# Patient Record
Sex: MALE | Race: WHITE | NOT HISPANIC OR LATINO | URBAN - METROPOLITAN AREA
[De-identification: names, ages, dates, MRNs, and addresses within clinical notes are randomized per-mention and may not be internally consistent; named-entity substitution may affect disease eponyms.]

---

## 2017-01-23 ENCOUNTER — RECORDS - HEALTHEAST (OUTPATIENT)
Dept: LAB | Facility: CLINIC | Age: 62
End: 2017-01-23

## 2017-01-23 LAB
CHOLEST SERPL-MCNC: 189 MG/DL
FASTING STATUS PATIENT QL REPORTED: ABNORMAL
HDLC SERPL-MCNC: 41 MG/DL
LDLC SERPL CALC-MCNC: 112 MG/DL
TRIGL SERPL-MCNC: 182 MG/DL

## 2017-01-24 LAB — HBA1C MFR BLD: 5.4 % (ref 4.2–6.1)

## 2017-05-16 ENCOUNTER — AMBULATORY - HEALTHEAST (OUTPATIENT)
Dept: FAMILY MEDICINE | Facility: CLINIC | Age: 62
End: 2017-05-16

## 2017-05-16 ENCOUNTER — OFFICE VISIT - HEALTHEAST (OUTPATIENT)
Dept: FAMILY MEDICINE | Facility: CLINIC | Age: 62
End: 2017-05-16

## 2017-05-16 DIAGNOSIS — F20.9 SCHIZOPHRENIA, CHRONIC CONDITION (H): ICD-10-CM

## 2017-05-16 DIAGNOSIS — M48.061 LUMBAR SPINAL STENOSIS: ICD-10-CM

## 2017-05-16 DIAGNOSIS — E03.9 HYPOTHYROIDISM: ICD-10-CM

## 2017-05-16 RX ORDER — METHYLPREDNISOLONE 4 MG
1500 TABLET, DOSE PACK ORAL
Status: SHIPPED | COMMUNITY
Start: 2017-05-16

## 2017-09-29 ENCOUNTER — RECORDS - HEALTHEAST (OUTPATIENT)
Dept: ADMINISTRATIVE | Facility: OTHER | Age: 62
End: 2017-09-29

## 2017-09-29 ENCOUNTER — OFFICE VISIT - HEALTHEAST (OUTPATIENT)
Dept: FAMILY MEDICINE | Facility: CLINIC | Age: 62
End: 2017-09-29

## 2017-09-29 DIAGNOSIS — N48.89 PENILE EDEMA: ICD-10-CM

## 2017-09-29 DIAGNOSIS — L08.9 SKIN LESION, INFECTED: ICD-10-CM

## 2017-10-03 ENCOUNTER — RECORDS - HEALTHEAST (OUTPATIENT)
Dept: ADMINISTRATIVE | Facility: OTHER | Age: 62
End: 2017-10-03

## 2017-10-27 ENCOUNTER — RECORDS - HEALTHEAST (OUTPATIENT)
Dept: ADMINISTRATIVE | Facility: OTHER | Age: 62
End: 2017-10-27

## 2017-11-17 ENCOUNTER — RECORDS - HEALTHEAST (OUTPATIENT)
Dept: ADMINISTRATIVE | Facility: OTHER | Age: 62
End: 2017-11-17

## 2017-12-01 ENCOUNTER — RECORDS - HEALTHEAST (OUTPATIENT)
Dept: ADMINISTRATIVE | Facility: OTHER | Age: 62
End: 2017-12-01

## 2018-01-26 ENCOUNTER — RECORDS - HEALTHEAST (OUTPATIENT)
Dept: ADMINISTRATIVE | Facility: OTHER | Age: 63
End: 2018-01-26

## 2018-02-07 ENCOUNTER — AMBULATORY - HEALTHEAST (OUTPATIENT)
Dept: VASCULAR SURGERY | Facility: CLINIC | Age: 63
End: 2018-02-07

## 2018-02-07 DIAGNOSIS — N48.89 SWELLING OF PENIS: ICD-10-CM

## 2018-02-20 ENCOUNTER — AMBULATORY - HEALTHEAST (OUTPATIENT)
Dept: VASCULAR SURGERY | Facility: CLINIC | Age: 63
End: 2018-02-20

## 2018-02-21 ENCOUNTER — OFFICE VISIT - HEALTHEAST (OUTPATIENT)
Dept: VASCULAR SURGERY | Facility: CLINIC | Age: 63
End: 2018-02-21

## 2018-02-21 ENCOUNTER — RECORDS - HEALTHEAST (OUTPATIENT)
Dept: ADMINISTRATIVE | Facility: OTHER | Age: 63
End: 2018-02-21

## 2018-02-21 DIAGNOSIS — N48.5: ICD-10-CM

## 2018-02-21 DIAGNOSIS — N48.89 PAINFUL PENIS: ICD-10-CM

## 2018-02-21 ASSESSMENT — MIFFLIN-ST. JEOR: SCORE: 1880.35

## 2018-02-26 ENCOUNTER — AMBULATORY - HEALTHEAST (OUTPATIENT)
Dept: VASCULAR SURGERY | Facility: CLINIC | Age: 63
End: 2018-02-26

## 2018-02-26 ENCOUNTER — COMMUNICATION - HEALTHEAST (OUTPATIENT)
Dept: VASCULAR SURGERY | Facility: CLINIC | Age: 63
End: 2018-02-26

## 2018-02-26 DIAGNOSIS — N48.89 PAINFUL PENIS: ICD-10-CM

## 2018-02-26 DIAGNOSIS — N48.5: ICD-10-CM

## 2018-02-26 LAB
BACTERIA SPEC CULT: ABNORMAL
GRAM STAIN RESULT: ABNORMAL
GRAM STAIN RESULT: ABNORMAL

## 2018-03-15 ENCOUNTER — OFFICE VISIT - HEALTHEAST (OUTPATIENT)
Dept: VASCULAR SURGERY | Facility: CLINIC | Age: 63
End: 2018-03-15

## 2018-03-15 DIAGNOSIS — N48.5: ICD-10-CM

## 2018-03-15 ASSESSMENT — MIFFLIN-ST. JEOR: SCORE: 1884.88

## 2018-03-26 ENCOUNTER — OFFICE VISIT - HEALTHEAST (OUTPATIENT)
Dept: VASCULAR SURGERY | Facility: CLINIC | Age: 63
End: 2018-03-26

## 2018-03-26 DIAGNOSIS — N48.5: ICD-10-CM

## 2018-03-26 ASSESSMENT — MIFFLIN-ST. JEOR: SCORE: 1884.88

## 2018-04-16 ENCOUNTER — RECORDS - HEALTHEAST (OUTPATIENT)
Dept: ADMINISTRATIVE | Facility: OTHER | Age: 63
End: 2018-04-16

## 2018-04-16 ENCOUNTER — OFFICE VISIT - HEALTHEAST (OUTPATIENT)
Dept: VASCULAR SURGERY | Facility: CLINIC | Age: 63
End: 2018-04-16

## 2018-04-16 DIAGNOSIS — N48.5: ICD-10-CM

## 2018-05-17 ENCOUNTER — OFFICE VISIT - HEALTHEAST (OUTPATIENT)
Dept: VASCULAR SURGERY | Facility: CLINIC | Age: 63
End: 2018-05-17

## 2018-05-17 DIAGNOSIS — N48.5: ICD-10-CM

## 2018-06-13 ENCOUNTER — OFFICE VISIT - HEALTHEAST (OUTPATIENT)
Dept: VASCULAR SURGERY | Facility: CLINIC | Age: 63
End: 2018-06-13

## 2018-06-13 ENCOUNTER — RECORDS - HEALTHEAST (OUTPATIENT)
Dept: ADMINISTRATIVE | Facility: OTHER | Age: 63
End: 2018-06-13

## 2018-06-13 DIAGNOSIS — N48.5: ICD-10-CM

## 2018-06-13 ASSESSMENT — MIFFLIN-ST. JEOR: SCORE: 1903.03

## 2018-07-11 ENCOUNTER — OFFICE VISIT - HEALTHEAST (OUTPATIENT)
Dept: VASCULAR SURGERY | Facility: CLINIC | Age: 63
End: 2018-07-11

## 2018-07-11 DIAGNOSIS — N48.5: ICD-10-CM

## 2018-08-30 ENCOUNTER — RECORDS - HEALTHEAST (OUTPATIENT)
Dept: ADMINISTRATIVE | Facility: OTHER | Age: 63
End: 2018-08-30

## 2018-08-30 ENCOUNTER — OFFICE VISIT - HEALTHEAST (OUTPATIENT)
Dept: VASCULAR SURGERY | Facility: CLINIC | Age: 63
End: 2018-08-30

## 2018-08-30 DIAGNOSIS — N48.5: ICD-10-CM

## 2018-08-30 ASSESSMENT — MIFFLIN-ST. JEOR: SCORE: 1849.05

## 2018-10-11 ENCOUNTER — OFFICE VISIT - HEALTHEAST (OUTPATIENT)
Dept: VASCULAR SURGERY | Facility: CLINIC | Age: 63
End: 2018-10-11

## 2018-10-11 DIAGNOSIS — N48.5: ICD-10-CM

## 2018-10-11 ASSESSMENT — MIFFLIN-ST. JEOR: SCORE: 1843.61

## 2018-10-22 ENCOUNTER — COMMUNICATION - HEALTHEAST (OUTPATIENT)
Dept: VASCULAR SURGERY | Facility: CLINIC | Age: 63
End: 2018-10-22

## 2018-10-29 ENCOUNTER — RECORDS - HEALTHEAST (OUTPATIENT)
Dept: ADMINISTRATIVE | Facility: OTHER | Age: 63
End: 2018-10-29

## 2018-11-13 ENCOUNTER — OFFICE VISIT - HEALTHEAST (OUTPATIENT)
Dept: FAMILY MEDICINE | Facility: CLINIC | Age: 63
End: 2018-11-13

## 2018-11-13 DIAGNOSIS — M54.50 ACUTE BILATERAL LOW BACK PAIN WITHOUT SCIATICA: ICD-10-CM

## 2018-11-13 DIAGNOSIS — Z12.11 SCREEN FOR COLON CANCER: ICD-10-CM

## 2018-11-14 ENCOUNTER — COMMUNICATION - HEALTHEAST (OUTPATIENT)
Dept: FAMILY MEDICINE | Facility: CLINIC | Age: 63
End: 2018-11-14

## 2018-11-15 ENCOUNTER — HOSPITAL ENCOUNTER (OUTPATIENT)
Dept: PHYSICAL MEDICINE AND REHAB | Facility: CLINIC | Age: 63
Discharge: HOME | End: 2018-11-15
Attending: FAMILY MEDICINE

## 2018-11-15 DIAGNOSIS — Z98.1 HISTORY OF LUMBAR FUSION: ICD-10-CM

## 2018-11-15 DIAGNOSIS — S39.012A STRAIN OF LUMBAR REGION, INITIAL ENCOUNTER: ICD-10-CM

## 2018-11-15 DIAGNOSIS — M54.50 LUMBAR SPINE PAIN: ICD-10-CM

## 2018-11-15 ASSESSMENT — MIFFLIN-ST. JEOR: SCORE: 1810.05

## 2018-11-16 ENCOUNTER — OFFICE VISIT - HEALTHEAST (OUTPATIENT)
Dept: PHYSICAL THERAPY | Facility: REHABILITATION | Age: 63
End: 2018-11-16

## 2018-11-16 ENCOUNTER — HOSPITAL ENCOUNTER (OUTPATIENT)
Dept: RADIOLOGY | Facility: CLINIC | Age: 63
Discharge: HOME | End: 2018-11-16
Attending: PHYSICIAN ASSISTANT

## 2018-11-16 DIAGNOSIS — M54.50 LUMBAR SPINE PAIN: ICD-10-CM

## 2018-11-16 DIAGNOSIS — Z98.1 HISTORY OF LUMBAR FUSION: ICD-10-CM

## 2018-11-16 DIAGNOSIS — S39.012A STRAIN OF LUMBAR REGION, INITIAL ENCOUNTER: ICD-10-CM

## 2018-11-19 ENCOUNTER — COMMUNICATION - HEALTHEAST (OUTPATIENT)
Dept: PHYSICAL MEDICINE AND REHAB | Facility: CLINIC | Age: 63
End: 2018-11-19

## 2018-11-20 ENCOUNTER — OFFICE VISIT - HEALTHEAST (OUTPATIENT)
Dept: PHYSICAL THERAPY | Facility: REHABILITATION | Age: 63
End: 2018-11-20

## 2018-11-20 DIAGNOSIS — Z98.1 HISTORY OF LUMBAR FUSION: ICD-10-CM

## 2018-11-20 DIAGNOSIS — M54.50 LUMBAR SPINE PAIN: ICD-10-CM

## 2018-11-20 DIAGNOSIS — S39.012A STRAIN OF LUMBAR REGION, INITIAL ENCOUNTER: ICD-10-CM

## 2018-11-26 ENCOUNTER — OFFICE VISIT - HEALTHEAST (OUTPATIENT)
Dept: VASCULAR SURGERY | Facility: CLINIC | Age: 63
End: 2018-11-26

## 2018-11-26 DIAGNOSIS — N48.5: ICD-10-CM

## 2018-11-26 ASSESSMENT — MIFFLIN-ST. JEOR: SCORE: 1808.23

## 2018-11-27 ENCOUNTER — OFFICE VISIT - HEALTHEAST (OUTPATIENT)
Dept: PHYSICAL THERAPY | Facility: REHABILITATION | Age: 63
End: 2018-11-27

## 2018-11-27 DIAGNOSIS — M54.50 LUMBAR SPINE PAIN: ICD-10-CM

## 2018-11-27 DIAGNOSIS — Z98.1 HISTORY OF LUMBAR FUSION: ICD-10-CM

## 2018-11-27 DIAGNOSIS — S39.012A STRAIN OF LUMBAR REGION, INITIAL ENCOUNTER: ICD-10-CM

## 2018-12-14 ENCOUNTER — HOSPITAL ENCOUNTER (OUTPATIENT)
Dept: PHYSICAL MEDICINE AND REHAB | Facility: CLINIC | Age: 63
Discharge: HOME | End: 2018-12-14
Attending: PHYSICIAN ASSISTANT

## 2018-12-14 DIAGNOSIS — M54.50 LUMBAR SPINE PAIN: ICD-10-CM

## 2018-12-14 DIAGNOSIS — Z98.1 HISTORY OF LUMBAR FUSION: ICD-10-CM

## 2018-12-14 DIAGNOSIS — M47.816 LUMBAR FACET ARTHROPATHY: ICD-10-CM

## 2019-01-07 ENCOUNTER — RECORDS - HEALTHEAST (OUTPATIENT)
Dept: ADMINISTRATIVE | Facility: OTHER | Age: 64
End: 2019-01-07

## 2019-01-08 ENCOUNTER — HOSPITAL ENCOUNTER (OUTPATIENT)
Dept: MRI IMAGING | Facility: CLINIC | Age: 64
Discharge: HOME | End: 2019-01-08
Attending: PHYSICIAN ASSISTANT

## 2019-01-08 DIAGNOSIS — M54.50 LUMBAR SPINE PAIN: ICD-10-CM

## 2019-01-08 DIAGNOSIS — Z98.1 HISTORY OF LUMBAR FUSION: ICD-10-CM

## 2019-01-09 ENCOUNTER — COMMUNICATION - HEALTHEAST (OUTPATIENT)
Dept: PHYSICAL MEDICINE AND REHAB | Facility: CLINIC | Age: 64
End: 2019-01-09

## 2019-01-09 DIAGNOSIS — M47.816 LUMBAR FACET JOINT SYNDROME: ICD-10-CM

## 2019-01-14 ENCOUNTER — RECORDS - HEALTHEAST (OUTPATIENT)
Dept: ADMINISTRATIVE | Facility: OTHER | Age: 64
End: 2019-01-14

## 2019-01-28 ENCOUNTER — HOSPITAL ENCOUNTER (OUTPATIENT)
Dept: PHYSICAL MEDICINE AND REHAB | Facility: CLINIC | Age: 64
Discharge: HOME | End: 2019-01-28
Attending: PHYSICIAN ASSISTANT

## 2019-01-28 DIAGNOSIS — M47.816 LUMBAR FACET JOINT SYNDROME: ICD-10-CM

## 2019-02-11 ENCOUNTER — HOSPITAL ENCOUNTER (OUTPATIENT)
Dept: PHYSICAL MEDICINE AND REHAB | Facility: CLINIC | Age: 64
Discharge: HOME | End: 2019-02-11
Attending: PHYSICIAN ASSISTANT

## 2019-02-11 DIAGNOSIS — M54.6 ACUTE BILATERAL THORACIC BACK PAIN: ICD-10-CM

## 2019-02-11 DIAGNOSIS — M79.18 MYOFASCIAL PAIN: ICD-10-CM

## 2019-02-11 DIAGNOSIS — Z98.1 HISTORY OF LUMBAR FUSION: ICD-10-CM

## 2019-02-11 DIAGNOSIS — M47.816 LUMBAR FACET ARTHROPATHY: ICD-10-CM

## 2019-02-11 DIAGNOSIS — M47.816 LUMBAR FACET JOINT SYNDROME: ICD-10-CM

## 2019-02-14 ENCOUNTER — HOSPITAL ENCOUNTER (OUTPATIENT)
Dept: RADIOLOGY | Facility: CLINIC | Age: 64
Discharge: HOME | End: 2019-02-14
Attending: PHYSICIAN ASSISTANT

## 2019-02-14 DIAGNOSIS — M54.6 ACUTE BILATERAL THORACIC BACK PAIN: ICD-10-CM

## 2019-02-15 ENCOUNTER — COMMUNICATION - HEALTHEAST (OUTPATIENT)
Dept: PHYSICAL MEDICINE AND REHAB | Facility: CLINIC | Age: 64
End: 2019-02-15

## 2019-02-15 DIAGNOSIS — M54.6 PAIN IN THORACIC SPINE: ICD-10-CM

## 2019-02-19 ENCOUNTER — OFFICE VISIT - HEALTHEAST (OUTPATIENT)
Dept: PHYSICAL THERAPY | Facility: REHABILITATION | Age: 64
End: 2019-02-19

## 2019-02-19 DIAGNOSIS — M54.6 PAIN IN THORACIC SPINE: ICD-10-CM

## 2019-02-19 DIAGNOSIS — R29.3 POOR POSTURE: ICD-10-CM

## 2019-02-19 DIAGNOSIS — M62.81 GENERALIZED MUSCLE WEAKNESS: ICD-10-CM

## 2019-03-05 ENCOUNTER — OFFICE VISIT - HEALTHEAST (OUTPATIENT)
Dept: PHYSICAL THERAPY | Facility: REHABILITATION | Age: 64
End: 2019-03-05

## 2019-03-05 DIAGNOSIS — R29.3 POOR POSTURE: ICD-10-CM

## 2019-03-05 DIAGNOSIS — M62.81 GENERALIZED MUSCLE WEAKNESS: ICD-10-CM

## 2019-03-05 DIAGNOSIS — M54.6 PAIN IN THORACIC SPINE: ICD-10-CM

## 2019-03-12 ENCOUNTER — OFFICE VISIT - HEALTHEAST (OUTPATIENT)
Dept: PHYSICAL THERAPY | Facility: REHABILITATION | Age: 64
End: 2019-03-12

## 2019-03-12 DIAGNOSIS — M54.6 PAIN IN THORACIC SPINE: ICD-10-CM

## 2019-03-12 DIAGNOSIS — R29.3 POOR POSTURE: ICD-10-CM

## 2019-03-12 DIAGNOSIS — M62.81 GENERALIZED MUSCLE WEAKNESS: ICD-10-CM

## 2019-05-14 ENCOUNTER — OFFICE VISIT - HEALTHEAST (OUTPATIENT)
Dept: FAMILY MEDICINE | Facility: CLINIC | Age: 64
End: 2019-05-14

## 2019-05-14 DIAGNOSIS — R21 RASH: ICD-10-CM

## 2019-05-14 ASSESSMENT — MIFFLIN-ST. JEOR: SCORE: 1826.37

## 2019-05-15 ENCOUNTER — COMMUNICATION - HEALTHEAST (OUTPATIENT)
Dept: FAMILY MEDICINE | Facility: CLINIC | Age: 64
End: 2019-05-15

## 2019-05-15 DIAGNOSIS — R21 RASH: ICD-10-CM

## 2019-12-16 ENCOUNTER — AMBULATORY - HEALTHEAST (OUTPATIENT)
Dept: FAMILY MEDICINE | Facility: CLINIC | Age: 64
End: 2019-12-16

## 2020-08-17 ENCOUNTER — COMMUNICATION - HEALTHEAST (OUTPATIENT)
Dept: FAMILY MEDICINE | Facility: CLINIC | Age: 65
End: 2020-08-17

## 2020-09-20 ENCOUNTER — COMMUNICATION - HEALTHEAST (OUTPATIENT)
Dept: FAMILY MEDICINE | Facility: CLINIC | Age: 65
End: 2020-09-20

## 2020-09-20 DIAGNOSIS — R33.9 RETENTION OF URINE: ICD-10-CM

## 2020-09-22 RX ORDER — TAMSULOSIN HYDROCHLORIDE 0.4 MG/1
CAPSULE ORAL
Qty: 30 CAPSULE | Refills: 11 | Status: SHIPPED | OUTPATIENT
Start: 2020-09-22 | End: 2021-08-24

## 2020-10-22 ENCOUNTER — COMMUNICATION - HEALTHEAST (OUTPATIENT)
Dept: FAMILY MEDICINE | Facility: CLINIC | Age: 65
End: 2020-10-22

## 2020-10-22 DIAGNOSIS — E78.00 HYPERCHOLESTEROLEMIA: ICD-10-CM

## 2020-10-22 DIAGNOSIS — F20.3 UNDIFFERENTIATED SCHIZOPHRENIA (H): ICD-10-CM

## 2020-10-22 DIAGNOSIS — E03.9 HYPOTHYROIDISM, UNSPECIFIED TYPE: ICD-10-CM

## 2020-10-23 ENCOUNTER — COMMUNICATION - HEALTHEAST (OUTPATIENT)
Dept: FAMILY MEDICINE | Facility: CLINIC | Age: 65
End: 2020-10-23

## 2020-10-23 DIAGNOSIS — K59.01 SLOW TRANSIT CONSTIPATION: ICD-10-CM

## 2020-10-23 RX ORDER — LEVOTHYROXINE SODIUM 200 UG/1
TABLET ORAL
Qty: 30 TABLET | Refills: 10 | Status: SHIPPED | OUTPATIENT
Start: 2020-10-23

## 2020-10-23 RX ORDER — SIMVASTATIN 20 MG
TABLET ORAL
Qty: 30 TABLET | Refills: 10 | Status: SHIPPED | OUTPATIENT
Start: 2020-10-23 | End: 2021-10-01

## 2020-11-03 ENCOUNTER — RECORDS - HEALTHEAST (OUTPATIENT)
Dept: ADMINISTRATIVE | Facility: OTHER | Age: 65
End: 2020-11-03

## 2020-11-04 ENCOUNTER — COMMUNICATION - HEALTHEAST (OUTPATIENT)
Dept: FAMILY MEDICINE | Facility: CLINIC | Age: 65
End: 2020-11-04

## 2020-11-04 DIAGNOSIS — K59.01 SLOW TRANSIT CONSTIPATION: ICD-10-CM

## 2020-11-06 RX ORDER — DOCUSATE SODIUM 50MG AND SENNOSIDES 8.6MG 8.6; 5 MG/1; MG/1
TABLET, FILM COATED ORAL
Qty: 100 TABLET | Refills: 11 | Status: SHIPPED | OUTPATIENT
Start: 2020-11-06 | End: 2021-10-28

## 2020-11-15 ENCOUNTER — COMMUNICATION - HEALTHEAST (OUTPATIENT)
Dept: FAMILY MEDICINE | Facility: CLINIC | Age: 65
End: 2020-11-15

## 2020-11-15 DIAGNOSIS — F20.9 SCHIZOPHRENIA, CHRONIC CONDITION (H): ICD-10-CM

## 2020-11-15 DIAGNOSIS — I10 ESSENTIAL HYPERTENSION: ICD-10-CM

## 2020-11-16 ENCOUNTER — OFFICE VISIT - HEALTHEAST (OUTPATIENT)
Dept: FAMILY MEDICINE | Facility: CLINIC | Age: 65
End: 2020-11-16

## 2020-11-16 DIAGNOSIS — Z12.11 COLON CANCER SCREENING: ICD-10-CM

## 2020-11-16 DIAGNOSIS — F20.3 UNDIFFERENTIATED SCHIZOPHRENIA (H): ICD-10-CM

## 2020-11-16 DIAGNOSIS — R73.01 IMPAIRED FASTING GLUCOSE: ICD-10-CM

## 2020-11-16 DIAGNOSIS — E78.00 HYPERCHOLESTEROLEMIA: ICD-10-CM

## 2020-11-16 DIAGNOSIS — F20.9 SCHIZOPHRENIA, CHRONIC CONDITION (H): ICD-10-CM

## 2020-11-16 DIAGNOSIS — E03.9 HYPOTHYROIDISM, UNSPECIFIED TYPE: ICD-10-CM

## 2020-11-16 DIAGNOSIS — M48.061 SPINAL STENOSIS OF LUMBAR REGION, UNSPECIFIED WHETHER NEUROGENIC CLAUDICATION PRESENT: ICD-10-CM

## 2020-11-16 LAB
ALBUMIN SERPL-MCNC: 4 G/DL (ref 3.5–5)
ALP SERPL-CCNC: 118 U/L (ref 45–120)
ALT SERPL W P-5'-P-CCNC: 32 U/L (ref 0–45)
ANION GAP SERPL CALCULATED.3IONS-SCNC: 7 MMOL/L (ref 5–18)
AST SERPL W P-5'-P-CCNC: 24 U/L (ref 0–40)
BILIRUB SERPL-MCNC: 0.5 MG/DL (ref 0–1)
BUN SERPL-MCNC: 12 MG/DL (ref 8–22)
CALCIUM SERPL-MCNC: 9.5 MG/DL (ref 8.5–10.5)
CHLORIDE BLD-SCNC: 99 MMOL/L (ref 98–107)
CHOLEST SERPL-MCNC: 165 MG/DL
CO2 SERPL-SCNC: 25 MMOL/L (ref 22–31)
CREAT SERPL-MCNC: 1.28 MG/DL (ref 0.7–1.3)
ERYTHROCYTE [DISTWIDTH] IN BLOOD BY AUTOMATED COUNT: 12.2 % (ref 11–14.5)
FASTING STATUS PATIENT QL REPORTED: NO
GFR SERPL CREATININE-BSD FRML MDRD: 57 ML/MIN/1.73M2
GLUCOSE BLD-MCNC: 102 MG/DL (ref 70–125)
HCT VFR BLD AUTO: 44.6 % (ref 40–54)
HDLC SERPL-MCNC: 53 MG/DL
HGB BLD-MCNC: 15 G/DL (ref 14–18)
LDLC SERPL CALC-MCNC: 94 MG/DL
MCH RBC QN AUTO: 30.6 PG (ref 27–34)
MCHC RBC AUTO-ENTMCNC: 33.7 G/DL (ref 32–36)
MCV RBC AUTO: 91 FL (ref 80–100)
PLATELET # BLD AUTO: 288 THOU/UL (ref 140–440)
PMV BLD AUTO: 6.8 FL (ref 7–10)
POTASSIUM BLD-SCNC: 5.2 MMOL/L (ref 3.5–5)
PROT SERPL-MCNC: 6.8 G/DL (ref 6–8)
RBC # BLD AUTO: 4.91 MILL/UL (ref 4.4–6.2)
SODIUM SERPL-SCNC: 131 MMOL/L (ref 136–145)
T4 FREE SERPL-MCNC: 1.7 NG/DL (ref 0.7–1.8)
TRIGL SERPL-MCNC: 90 MG/DL
TSH SERPL DL<=0.005 MIU/L-ACNC: 0.11 UIU/ML (ref 0.3–5)
WBC: 8.7 THOU/UL (ref 4–11)

## 2020-11-17 RX ORDER — METOPROLOL SUCCINATE 25 MG/1
TABLET, EXTENDED RELEASE ORAL
Qty: 30 TABLET | Refills: 10 | Status: SHIPPED | OUTPATIENT
Start: 2020-11-17 | End: 2021-10-01

## 2020-11-17 RX ORDER — LISINOPRIL 5 MG/1
TABLET ORAL
Qty: 30 TABLET | Refills: 10 | Status: SHIPPED | OUTPATIENT
Start: 2020-11-17 | End: 2021-10-01

## 2020-11-18 ENCOUNTER — COMMUNICATION - HEALTHEAST (OUTPATIENT)
Dept: FAMILY MEDICINE | Facility: CLINIC | Age: 65
End: 2020-11-18

## 2020-11-18 DIAGNOSIS — F20.3 UNDIFFERENTIATED SCHIZOPHRENIA (H): ICD-10-CM

## 2020-12-22 ENCOUNTER — OFFICE VISIT - HEALTHEAST (OUTPATIENT)
Dept: BEHAVIORAL HEALTH | Facility: CLINIC | Age: 65
End: 2020-12-22

## 2020-12-22 DIAGNOSIS — F20.9 SCHIZOPHRENIA, CHRONIC CONDITION (H): ICD-10-CM

## 2020-12-22 ASSESSMENT — ANXIETY QUESTIONNAIRES
GAD7 TOTAL SCORE: 1
2. NOT BEING ABLE TO STOP OR CONTROL WORRYING: NOT AT ALL
5. BEING SO RESTLESS THAT IT IS HARD TO SIT STILL: NOT AT ALL
6. BECOMING EASILY ANNOYED OR IRRITABLE: NOT AT ALL
IF YOU CHECKED OFF ANY PROBLEMS ON THIS QUESTIONNAIRE, HOW DIFFICULT HAVE THESE PROBLEMS MADE IT FOR YOU TO DO YOUR WORK, TAKE CARE OF THINGS AT HOME, OR GET ALONG WITH OTHER PEOPLE: NOT DIFFICULT AT ALL
1. FEELING NERVOUS, ANXIOUS, OR ON EDGE: SEVERAL DAYS
3. WORRYING TOO MUCH ABOUT DIFFERENT THINGS: NOT AT ALL
7. FEELING AFRAID AS IF SOMETHING AWFUL MIGHT HAPPEN: NOT AT ALL
4. TROUBLE RELAXING: NOT AT ALL

## 2020-12-22 ASSESSMENT — PATIENT HEALTH QUESTIONNAIRE - PHQ9: SUM OF ALL RESPONSES TO PHQ QUESTIONS 1-9: 0

## 2020-12-23 ENCOUNTER — COMMUNICATION - HEALTHEAST (OUTPATIENT)
Dept: BEHAVIORAL HEALTH | Facility: CLINIC | Age: 65
End: 2020-12-23

## 2020-12-23 ENCOUNTER — COMMUNICATION - HEALTHEAST (OUTPATIENT)
Dept: FAMILY MEDICINE | Facility: CLINIC | Age: 65
End: 2020-12-23

## 2020-12-23 DIAGNOSIS — K21.9 GASTROESOPHAGEAL REFLUX DISEASE WITHOUT ESOPHAGITIS: ICD-10-CM

## 2020-12-27 RX ORDER — FAMOTIDINE 20 MG/1
TABLET, FILM COATED ORAL
Qty: 30 TABLET | Refills: 10 | Status: SHIPPED | OUTPATIENT
Start: 2020-12-27 | End: 2021-10-28

## 2021-01-26 ENCOUNTER — COMMUNICATION - HEALTHEAST (OUTPATIENT)
Dept: FAMILY MEDICINE | Facility: CLINIC | Age: 66
End: 2021-01-26

## 2021-02-16 ENCOUNTER — COMMUNICATION - HEALTHEAST (OUTPATIENT)
Dept: FAMILY MEDICINE | Facility: CLINIC | Age: 66
End: 2021-02-16

## 2021-02-16 DIAGNOSIS — F20.3 UNDIFFERENTIATED SCHIZOPHRENIA (H): ICD-10-CM

## 2021-03-17 ENCOUNTER — COMMUNICATION - HEALTHEAST (OUTPATIENT)
Dept: BEHAVIORAL HEALTH | Facility: CLINIC | Age: 66
End: 2021-03-17

## 2021-03-17 DIAGNOSIS — F20.9 SCHIZOPHRENIA, CHRONIC CONDITION (H): ICD-10-CM

## 2021-04-16 ENCOUNTER — OFFICE VISIT - HEALTHEAST (OUTPATIENT)
Dept: BEHAVIORAL HEALTH | Facility: CLINIC | Age: 66
End: 2021-04-16

## 2021-04-16 DIAGNOSIS — F20.9 SCHIZOPHRENIA, CHRONIC CONDITION (H): ICD-10-CM

## 2021-04-16 DIAGNOSIS — E78.49 OTHER HYPERLIPIDEMIA: ICD-10-CM

## 2021-04-16 DIAGNOSIS — Z51.81 MEDICATION MONITORING ENCOUNTER: ICD-10-CM

## 2021-04-16 DIAGNOSIS — E74.89 OTHER SPECIFIED DISORDERS OF CARBOHYDRATE METABOLISM (H): ICD-10-CM

## 2021-04-16 ASSESSMENT — ANXIETY QUESTIONNAIRES
IF YOU CHECKED OFF ANY PROBLEMS ON THIS QUESTIONNAIRE, HOW DIFFICULT HAVE THESE PROBLEMS MADE IT FOR YOU TO DO YOUR WORK, TAKE CARE OF THINGS AT HOME, OR GET ALONG WITH OTHER PEOPLE: NOT DIFFICULT AT ALL
2. NOT BEING ABLE TO STOP OR CONTROL WORRYING: NEARLY EVERY DAY
5. BEING SO RESTLESS THAT IT IS HARD TO SIT STILL: NEARLY EVERY DAY
3. WORRYING TOO MUCH ABOUT DIFFERENT THINGS: NEARLY EVERY DAY
GAD7 TOTAL SCORE: 21
1. FEELING NERVOUS, ANXIOUS, OR ON EDGE: NEARLY EVERY DAY
4. TROUBLE RELAXING: NEARLY EVERY DAY
6. BECOMING EASILY ANNOYED OR IRRITABLE: NEARLY EVERY DAY
7. FEELING AFRAID AS IF SOMETHING AWFUL MIGHT HAPPEN: NEARLY EVERY DAY

## 2021-04-16 ASSESSMENT — PATIENT HEALTH QUESTIONNAIRE - PHQ9: SUM OF ALL RESPONSES TO PHQ QUESTIONS 1-9: 27

## 2021-05-01 ENCOUNTER — HEALTH MAINTENANCE LETTER (OUTPATIENT)
Age: 66
End: 2021-05-01

## 2021-05-14 ENCOUNTER — RECORDS - HEALTHEAST (OUTPATIENT)
Dept: ADMINISTRATIVE | Facility: OTHER | Age: 66
End: 2021-05-14

## 2021-05-14 ENCOUNTER — OFFICE VISIT - HEALTHEAST (OUTPATIENT)
Dept: BEHAVIORAL HEALTH | Facility: CLINIC | Age: 66
End: 2021-05-14

## 2021-05-14 DIAGNOSIS — F20.9 SCHIZOPHRENIA, CHRONIC CONDITION (H): ICD-10-CM

## 2021-05-14 ASSESSMENT — ANXIETY QUESTIONNAIRES
7. FEELING AFRAID AS IF SOMETHING AWFUL MIGHT HAPPEN: NOT AT ALL
2. NOT BEING ABLE TO STOP OR CONTROL WORRYING: NOT AT ALL
3. WORRYING TOO MUCH ABOUT DIFFERENT THINGS: NOT AT ALL
4. TROUBLE RELAXING: NOT AT ALL
IF YOU CHECKED OFF ANY PROBLEMS ON THIS QUESTIONNAIRE, HOW DIFFICULT HAVE THESE PROBLEMS MADE IT FOR YOU TO DO YOUR WORK, TAKE CARE OF THINGS AT HOME, OR GET ALONG WITH OTHER PEOPLE: NOT DIFFICULT AT ALL
2. NOT BEING ABLE TO STOP OR CONTROL WORRYING: NOT AT ALL
1. FEELING NERVOUS, ANXIOUS, OR ON EDGE: SEVERAL DAYS
4. TROUBLE RELAXING: NOT AT ALL
3. WORRYING TOO MUCH ABOUT DIFFERENT THINGS: NOT AT ALL
6. BECOMING EASILY ANNOYED OR IRRITABLE: NOT AT ALL
6. BECOMING EASILY ANNOYED OR IRRITABLE: NOT AT ALL
1. FEELING NERVOUS, ANXIOUS, OR ON EDGE: SEVERAL DAYS
GAD7 TOTAL SCORE: 1
7. FEELING AFRAID AS IF SOMETHING AWFUL MIGHT HAPPEN: NOT AT ALL
5. BEING SO RESTLESS THAT IT IS HARD TO SIT STILL: NOT AT ALL
IF YOU CHECKED OFF ANY PROBLEMS ON THIS QUESTIONNAIRE, HOW DIFFICULT HAVE THESE PROBLEMS MADE IT FOR YOU TO DO YOUR WORK, TAKE CARE OF THINGS AT HOME, OR GET ALONG WITH OTHER PEOPLE: NOT DIFFICULT AT ALL
5. BEING SO RESTLESS THAT IT IS HARD TO SIT STILL: NOT AT ALL

## 2021-05-14 ASSESSMENT — MIFFLIN-ST. JEOR: SCORE: 1791.44

## 2021-05-14 ASSESSMENT — PATIENT HEALTH QUESTIONNAIRE - PHQ9: SUM OF ALL RESPONSES TO PHQ QUESTIONS 1-9: 1

## 2021-05-25 ENCOUNTER — COMMUNICATION - HEALTHEAST (OUTPATIENT)
Dept: FAMILY MEDICINE | Facility: CLINIC | Age: 66
End: 2021-05-25

## 2021-05-25 DIAGNOSIS — K59.01 SLOW TRANSIT CONSTIPATION: ICD-10-CM

## 2021-05-25 DIAGNOSIS — F20.3 UNDIFFERENTIATED SCHIZOPHRENIA (H): ICD-10-CM

## 2021-05-26 RX ORDER — LANOLIN ALCOHOL/MO/W.PET/CERES
CREAM (GRAM) TOPICAL
Qty: 30 TABLET | Refills: 10 | Status: SHIPPED | OUTPATIENT
Start: 2021-05-26 | End: 2022-07-01

## 2021-05-26 RX ORDER — POLYETHYLENE GLYCOL 3350 17 G/17G
17 POWDER, FOR SOLUTION ORAL DAILY
Qty: 476 G | Refills: 10 | Status: SHIPPED | OUTPATIENT
Start: 2021-05-26 | End: 2022-05-27

## 2021-05-27 VITALS — BODY MASS INDEX: 33.08 KG/M2 | WEIGHT: 224 LBS | HEIGHT: 69 IN

## 2021-05-27 VITALS — DIASTOLIC BLOOD PRESSURE: 79 MMHG | SYSTOLIC BLOOD PRESSURE: 133 MMHG

## 2021-05-27 VITALS — HEART RATE: 87 BPM

## 2021-05-28 ASSESSMENT — ANXIETY QUESTIONNAIRES
GAD7 TOTAL SCORE: 1
GAD7 TOTAL SCORE: 21

## 2021-05-28 NOTE — TELEPHONE ENCOUNTER
Medication Question or Clarification  Who is calling: Other: Hugo from [facility]  What medication are you calling about? (include dose and sig)   Outpatient Medication Detail      Disp Refills Start End    clotrimazole (LOTRIMIN) 1 % cream 30 g 0 5/14/2019     Sig: Apply to affected area twice daily for two weeks.    Class: OTC    Associated Diagnoses     Rash  - Primary           Who prescribed the medication?: Sabi Christie NP  What is your question/concern?: Please send new prescription to the pharmacy. Pharmacy did not receive current prescription because OTC was checked.  Pharmacy: [facility]  Okay to leave a detailed message?: Yes  Site CMT - Please call the pharmacy to obtain any additional needed information.

## 2021-05-28 NOTE — PROGRESS NOTES
Assessment/Plan:    1. Rash  Discussed differential diagnoses of the rash with the patient including candidal infection, dermatitis, skin irritation from rubbing etc. given that patient's symptoms have persisted despite use of Vaseline these past few days, will provide prescription for Lotrimin cream.  Patient should apply this to affected areas twice daily for 2 weeks.  I recommend that he continue with barrier cream in addition to this.  He should return to clinic with any persisting or worsening symptoms.  Patient is content with this plan.  - clotrimazole (LOTRIMIN) 1 % cream; Apply to affected area twice daily for two weeks.  Dispense: 30 g; Refill: 0    Subjective:    Landon Covarrubias is a 63 year old male seen today for evaluation of a rash on his bottom.  Past medical history is significant for schizophrenia, hypothyroidism, hypertension.  Patient developed the rash 2 days ago after work.  He works at [employer] and does a significant amount of walking around.  On Sunday he had an extra long shift and noticed that his bottom became very sore which he attributed to the skin rubbing together.  Rash was not initially itchy but was somewhat painful.  He applied some Vaseline on it without relief.  This morning pain seemed to be improved however after couple of hours, the discomfort returned.  He denies any drainage.  No bumps or pustules that he is aware of.  He denies any rash elsewhere on his body.  He is not having any systemic symptoms of fevers, chills sweats etc.  No additional concerns today. Review of systems is as stated in HPI, and the remainder of the 10 system review is otherwise unremarkable.    Past Medical History, Family History, and Social History reviewed.    Past Surgical History:   Procedure Laterality Date     BACK SURGERY       LUMBAR LAMINECTOMY Bilateral 6/8/2015    Procedure: BILATERAL LAMINECTOMY WITH FACET CYST REMOVAL L4-5;  Surgeon: Bob Craig MD;  Location:  [facility];  Service:      TONSILLECTOMY       WISDOM TOOTH EXTRACTION          Family History   Problem Relation Age of Onset     Parkinsonism Mother      Kidney disease Father      No Medical Problems Sister      Anesthesia problems Neg Hx         Past Medical History:   Diagnosis Date     Acute Renal Insufficiency     Created by Conversion      Acute Renal Insufficiency     Created by Conversion  Replacement Utility updated for latest IMO load     Altered mental state 2016     Cellulitis of corpus cavernosum and penis 2018     Chronic kidney disease      Delirium 2016     Dementia praecox (H) 2017     Disease of thyroid gland      Elevated blood pressure      Encephalopathy acute      Essential hypertension      Hematuria 2016     Hypercholesterolemia      Hyperlipidemia     Created by Conversion      Hypokalemia 2016     Hypothyroidism     Created by Conversion      Hypothyroidism     Created by Conversion  Replacement Utility updated for latest IMO load     Impaired fasting glucose     Created by Conversion      Lateral epicondylitis     Created by Conversion  Replacement Utility updated for latest IMO load     Lumbar spinal stenosis 2015     Mood disorder due to a general medical condition      Other cognitive disorder due to general medical condition      Other specified fever      Penile swelling      Retention of urine 2016     Schizophrenia (H)     Created by Conversion  Replacement Utility updated for latest IMO load     Schizophrenia, chronic condition (H)      Spondylolisthesis of lumbar region 2016     Systemic infection (H) 2016     Undifferentiated schizophrenia (H)      Urinary tract infection 2016        Social History     Tobacco Use     Smoking status: Former Smoker     Packs/day: 1.00     Years: 20.00     Pack years: 20.00     Last attempt to quit:      Years since quittin.3     Smokeless tobacco: Never Used   Substance Use Topics      "Alcohol use: No     Drug use: No        Current Outpatient Medications   Medication Sig Dispense Refill     folic acid (FOLVITE) 400 MCG tablet TAKE 1 TAB BY MOUTH ONCE DAILY FOR SUPPLEMENT  11     glucosamine sulfate 500 mg Tab Take 1,500 mg by mouth.       haloperidol (HALDOL) 5 MG tablet Take 2.5 mg by mouth.       levothyroxine (SYNTHROID, LEVOTHROID) 200 MCG tablet TAKE 1 TAB BY MOUTH ONCE DAILY  11     lisinopril (PRINIVIL,ZESTRIL) 5 MG tablet Take 5 mg by mouth daily.  11     metoprolol succinate (TOPROL-XL) 25 MG Take 25 mg by mouth.       niacin 500 MG tablet Take 500 mg by mouth daily with breakfast.       OLANZapine (ZYPREXA) 20 MG tablet Take 20 mg by mouth bedtime.        omeprazole (PRILOSEC) 20 MG capsule Take 20 mg by mouth.       perphenazine 8 MG tablet Take 24 mg by mouth 2 (two) times a day.       QUEtiapine (SEROQUEL) 50 MG tablet Take 50 mg by mouth 3 (three) times a day as needed.       SENEXON-S 8.6-50 mg tablet        simvastatin (ZOCOR) 20 MG tablet Take 20 mg by mouth at bedtime.       tamsulosin (FLOMAX) 0.4 mg cap Take 0.4 mg by mouth daily.       clotrimazole (LOTRIMIN) 1 % cream Apply to affected area twice daily for two weeks. 30 g 0     No current facility-administered medications for this visit.           Objective:    Vitals:    05/14/19 1041   BP: 122/62   Patient Site: Left Arm   Patient Position: Sitting   Cuff Size: Adult Large   Pulse: 80   Weight: (!) 230 lb 9.6 oz (104.6 kg)   Height: 5' 9\" (1.753 m)      Body mass index is 34.05 kg/m .      General Appearance:  Alert, cooperative, no distress, appears stated age   Heart:  Regular rate and rhythm, S1, S2 normal, no murmur, rub or gallop   Skin:  Well -demarcated erythematous rash noted on patient's bottom, between skin folds.  No papules or pustules noted.  Area is nonpruritic.  No drainage.  Skin is otherwise warm, dry.        This note has been dictated using voice recognition software. Any grammatical or context " distortions are unintentional and inherent to the use of this software.

## 2021-05-31 VITALS — BODY MASS INDEX: 32.71 KG/M2 | WEIGHT: 228 LBS

## 2021-05-31 VITALS — BODY MASS INDEX: 34.29 KG/M2 | WEIGHT: 239 LBS

## 2021-06-01 VITALS — BODY MASS INDEX: 35.07 KG/M2 | WEIGHT: 244.4 LBS

## 2021-06-01 VITALS — HEIGHT: 70 IN | BODY MASS INDEX: 34.36 KG/M2 | WEIGHT: 240 LBS

## 2021-06-01 VITALS — WEIGHT: 239 LBS | HEIGHT: 70 IN | BODY MASS INDEX: 34.22 KG/M2

## 2021-06-01 VITALS — WEIGHT: 244 LBS | HEIGHT: 70 IN | BODY MASS INDEX: 34.93 KG/M2

## 2021-06-01 VITALS — BODY MASS INDEX: 34.36 KG/M2 | WEIGHT: 240 LBS | HEIGHT: 70 IN

## 2021-06-02 ENCOUNTER — RECORDS - HEALTHEAST (OUTPATIENT)
Dept: ADMINISTRATIVE | Facility: CLINIC | Age: 66
End: 2021-06-02

## 2021-06-02 VITALS — WEIGHT: 235.6 LBS | HEIGHT: 69 IN | BODY MASS INDEX: 34.9 KG/M2

## 2021-06-02 VITALS — HEIGHT: 69 IN | BODY MASS INDEX: 33.56 KG/M2 | WEIGHT: 226.6 LBS

## 2021-06-02 VITALS — HEIGHT: 69 IN | WEIGHT: 234.4 LBS | BODY MASS INDEX: 34.72 KG/M2

## 2021-06-02 VITALS — BODY MASS INDEX: 33.67 KG/M2 | WEIGHT: 228 LBS

## 2021-06-02 VITALS — BODY MASS INDEX: 33.62 KG/M2 | WEIGHT: 227 LBS | HEIGHT: 69 IN

## 2021-06-02 VITALS — BODY MASS INDEX: 33.37 KG/M2 | WEIGHT: 226 LBS

## 2021-06-02 VITALS — WEIGHT: 226 LBS | BODY MASS INDEX: 33.37 KG/M2

## 2021-06-03 ENCOUNTER — RECORDS - HEALTHEAST (OUTPATIENT)
Dept: ADMINISTRATIVE | Facility: CLINIC | Age: 66
End: 2021-06-03

## 2021-06-03 VITALS — BODY MASS INDEX: 34.16 KG/M2 | WEIGHT: 230.6 LBS | HEIGHT: 69 IN

## 2021-06-04 NOTE — PROGRESS NOTES
Spoke with pharmacist    Already using stool softener prn    Folic acid was not covered. Is about 2-3$ out of pocket. No covered alternatives.    Will have to purchase out of pocket.

## 2021-06-10 NOTE — PROGRESS NOTES
Patient ID: Landon Covarrubias is a 61 y.o. male.  /74  Pulse 95  Wt (!) 228 lb (103.4 kg)  SpO2 97%  BMI 32.71 kg/m2    Assessment/Plan:                   Diagnoses and all orders for this visit:    Schizophrenia, chronic condition    Lumbar spinal stenosis    Hypothyroidism        DISCUSSION  Paperwork completed continue current treatment.  Recommend follow-up within 6 months.  Subjective:     HPI    Landon Covarrubias is a 7-year-old man who has a history of schizophrenia managed by psychiatry.  He also has hypothyroidism and a history of spinal stenosis.  Treatment for his spinal stenosis over one year ago.  He utilizes a walker for ambulation because of residual weakness and gait instability associated with his spinal stenosis.  He feels his mental health is stable, he continues to see a psychiatrist.  He is on medications as listed.  He is here today specifically to have paperwork completed for [facility].  We completed the paperwork and discussed his recent health situation.  He is currently residing at an assisted living in [place].  He feels he is well taking care of things are going well overall.  He had recent labs including a recent TSH.    Review of Systems  Complete review of systems is obtained.  Other than the specific considerations noted above complete review of systems is negative.      Objective:   Medications:  Current Outpatient Prescriptions   Medication Sig Note     acetaminophen (TYLENOL) 325 MG tablet Take 650 mg by mouth. 5/16/2017: Received from: [facility] Received Sig: Take 650 mg by mouth every 4 hours if needed (pain). Max acetaminophen dose: 4000mg in 24 hrs.     acetaminophen (TYLENOL) 500 MG tablet Take 500 mg by mouth every 4 (four) hours as needed for pain.      benztropine (COGENTIN) 0.5 MG tablet Take 0.5 mg by mouth. 5/16/2017: Received from: [facility] Received Sig: Take 1 tablet by mouth 2  times daily. For extrapyramidal side effects     docoshexanoic acid-eicosapent 500 mg (FISH OIL) 500-100 mg cap capsule Take 500 mg by mouth daily.      glucosamine sulfate 500 mg cap Take 1,500 mg by mouth daily.      glucosamine sulfate 500 mg Tab Take 1,500 mg by mouth. 5/16/2017: Received from: [facility] Received Sig: Take 1,500 mg by mouth once daily.     haloperidol (HALDOL) 5 MG tablet Take 2.5 mg by mouth. 5/16/2017: Received from: [facility] Received Sig: Take 0.5 tablets by mouth every 6 hours if needed for Agitation.     levothyroxine (SYNTHROID, LEVOTHROID) 137 MCG tablet Take 137 mcg by mouth daily.      melatonin 3 mg Tab tablet Take 6 mg by mouth. 5/16/2017: Received from: [facility] Received Sig: Take 2 tablets by mouth at bedtime if needed for Sleep.     metoprolol succinate (TOPROL-XL) 25 MG Take 25 mg by mouth. 5/16/2017: Received from: [facility] Received Sig: Take 1 tablet by mouth once daily. For hypertension     metoprolol tartrate (LOPRESSOR) 25 MG tablet Take 1 tablet (25 mg total) by mouth 2 (two) times a day.      MULTIVITAMIN ORAL Take 1 tablet by mouth daily.       niacin 500 MG tablet Take 500 mg by mouth daily with breakfast.      OLANZapine (ZYPREXA) 20 MG tablet Take 20 mg by mouth bedtime.       omega-3 fatty acids 500 mg cap Take 1 capsule by mouth. 5/16/2017: Received from: [facility] Received Sig: Take 1 capsule by mouth once daily.     omeprazole (PRILOSEC) 20 MG capsule Take 20 mg by mouth. 5/16/2017: Received from: [facility] Received Sig: Take 20 mg by mouth every 24 hours.     perphenazine 8 MG tablet Take 24 mg by mouth 2 (two) times a day.      polyethylene glycol (MIRALAX) 17 gram packet Take 17 g by mouth daily.      QUEtiapine (SEROQUEL) 50 MG tablet Take 50 mg by  mouth 3 (three) times a day as needed.      senna-docusate (PERICOLACE) 8.6-50 mg tablet Take 1 tablet by mouth 2 (two) times a day. Take daily for constipation or while on narcotics. Hold if developing loose stools      simvastatin (ZOCOR) 20 MG tablet Take 20 mg by mouth bedtime.      tamsulosin (FLOMAX) 0.4 mg Cp24 Take 0.4 mg by mouth. 5/16/2017: [facility] Received Sig: Take 1 capsule by mouth once daily after a meal.     folic acid (FOLVITE) 400 MCG tablet TAKE 1 TAB BY MOUTH ONCE DAILY FOR SUPPLEMENT 5/16/2017: Received from: External Pharmacy     levothyroxine (SYNTHROID, LEVOTHROID) 200 MCG tablet TAKE 1 TAB BY MOUTH ONCE DAILY 5/16/2017: Received from: External Pharmacy     polyethylene glycol (GLYCOLAX) 17 gram/dose powder DISSOLVE 17 GMS IN WATER AND TAKE BY MOUTH ONCE DAILY FOR CONSTIPATION 5/16/2017: Received from: External Pharmacy     Allergies:  No Known Allergies    Tobacco:   reports that he has quit smoking. He does not have any smokeless tobacco history on file.     Physical Exam      /74  Pulse 95  Wt (!) 228 lb (103.4 kg)  SpO2 97%  BMI 32.71 kg/m2        General Appearance:    Alert, cooperative, no distress

## 2021-06-10 NOTE — TELEPHONE ENCOUNTER
Refill Request  Did you contact pharmacy: No  Medication name:   Requested Prescriptions     Pending Prescriptions Disp Refills     niacin 500 MG tablet   0     Sig: Take 1 tablet (500 mg total) by mouth daily with breakfast.   Who prescribed the medication: Provider historical   [facility]ed [facility]acy[facility]  Is patient out of medication: Yes  Patient notified refills processed in 3 business days:  yes  Okay to leave a detailed message: no

## 2021-06-10 NOTE — TELEPHONE ENCOUNTER
RN cannot approve Refill Request    RN can NOT refill this medication med is not covered by policy/route to provider     . Last office visit: 11/13/2018 Samuel Kovacs MD Last Physical: Visit date not found Last MTM visit: Visit date not found Last visit same specialty: 5/14/2019 Sabi Christie, [facility].  Next visit within 3 mo: Visit date not found  Next physical within 3 mo: Visit date not found      Su Rivera, [facility] Triage/Med Refill 8/17/2020    Requested Prescriptions   Pending Prescriptions Disp Refills     niacin 500 MG tablet  0     Sig: Take 1 tablet (500 mg total) by mouth daily with breakfast.       There is no refill protocol information for this order

## 2021-06-11 ENCOUNTER — OFFICE VISIT - HEALTHEAST (OUTPATIENT)
Dept: BEHAVIORAL HEALTH | Facility: CLINIC | Age: 66
End: 2021-06-11

## 2021-06-11 DIAGNOSIS — F20.9 SCHIZOPHRENIA, CHRONIC CONDITION (H): ICD-10-CM

## 2021-06-11 RX ORDER — BENZTROPINE MESYLATE 1 MG/1
1 TABLET ORAL 2 TIMES DAILY
Qty: 60 TABLET | Refills: 5 | Status: SHIPPED | OUTPATIENT
Start: 2021-06-11

## 2021-06-11 RX ORDER — OLANZAPINE 20 MG/1
20 TABLET ORAL AT BEDTIME
Qty: 30 TABLET | Refills: 5 | Status: SHIPPED | OUTPATIENT
Start: 2021-06-11

## 2021-06-11 RX ORDER — PERPHENAZINE 8 MG/1
8 TABLET ORAL 2 TIMES DAILY
Qty: 60 TABLET | Refills: 5 | Status: SHIPPED | OUTPATIENT
Start: 2021-06-11

## 2021-06-11 ASSESSMENT — MIFFLIN-ST. JEOR: SCORE: 1782.37

## 2021-06-11 NOTE — TELEPHONE ENCOUNTER
Who is calling:  Vidal   Reason for Call:  Caller is needing to know why medication was declined if someone can call them back to explain.   Date of last appointment with primary care:   Okay to leave a detailed message: Yes

## 2021-06-12 NOTE — TELEPHONE ENCOUNTER
Medication Request  Medication name:   polyethylene glycol (GLYCOLAX) 17 gram/dose powder   2/26/2019     Class: Historical Med        Requested Pharmacy: [facility]  Reason for request: Faxed in by pharmacy with a message stating the original hard copy is unavailable. We are asking you to refill this with refills.  When did you use medication last?:  unknown  Patient offered appointment:  N/A - electronic request  Okay to leave a detailed message: yes

## 2021-06-12 NOTE — TELEPHONE ENCOUNTER
Refills were requested for medications prescribed by psychiatrist.  I do not have a record of these medications and cannot verify dosing.  These medications should be refilled and managed by his psychiatrist.  This includes benztropine and perphenazine.  Please inform care facility.

## 2021-06-12 NOTE — TELEPHONE ENCOUNTER
RN cannot approve Refill Request    RN can NOT refill this medication Protocol failed and NO refill given. Last office visit: 2018 Samuel Kovacs MD Last Physical: Visit date not found Last MTM visit: Visit date not found Last visit same specialty: 2019 Sabi Christie CNP.  Next visit within 3 mo: Visit date not found  Next physical within 3 mo: Visit date not found      Su Rivera, [facility] Connection Triage/Med Refill 2020    Requested Prescriptions   Pending Prescriptions Disp Refills     SENEXON-S 8.6-50 mg tablet [Pharmacy Med Name: SENEXON-S 8.6MG-50MG TABLET] 100 tablet 11     Si TAB BY MOUTH TWICE DAILY (DX: CONSTIPATION)       GI Medications Refill Protocol Failed - 2020  8:25 AM        Failed - PCP or prescribing provider visit in last 12 or next 3 months.     Last office visit with prescriber/PCP: 2018 Samuel Kovacs MD OR same dept: Visit date not found OR same specialty: 2019 Sabi Christie CNP  Last physical: Visit date not found Last MTM visit: Visit date not found   Next visit within 3 mo: Visit date not found  Next physical within 3 mo: Visit date not found  Prescriber OR PCP: Samuel Kovacs MD  Last diagnosis associated with med order: There are no diagnoses linked to this encounter.  If protocol passes may refill for 12 months if within 3 months of last provider visit (or a total of 15 months).

## 2021-06-12 NOTE — TELEPHONE ENCOUNTER
RN cannot approve Refill Request    RN can NOT refill this medication med is not covered by policy/route to provider and Protocol failed and NO refill given. Last office visit: 2018 Samuel Kovacs MD Last Physical: Visit date not found Last MTM visit: Visit date not found Last visit same specialty: 2019 Sabi Christie CNP.  Next visit within 3 mo: Visit date not found  Next physical within 3 mo: Visit date not found      Su Rivera, [facility] Connection Triage/Med Refill 10/23/2020    Requested Prescriptions   Pending Prescriptions Disp Refills     benztropine (COGENTIN) 0.5 MG tablet [Pharmacy Med Name: BENZTROPINE MESYLATE 0.5MG TABLET] 60 tablet 10     Si TAB BY MOUTH TWICE DAILY       Parkinson's Meds I Refill Protocol Failed - 10/22/2020 12:25 PM        Failed - PCP or prescribing provider visit in past 6 months      Last office visit with prescriber/PCP: Visit date not found OR same dept: Visit date not found OR same specialty: 2019 Sabi Christie CNP Last physical: Visit date not found Last MTM visit: Visit date not found     Next appt within 3 mo: Visit date not found  Next physical within 3 mo: Visit date not found  Prescriber OR PCP: Samuel Kovacs MD  Last diagnosis associated with med order: There are no diagnoses linked to this encounter.  If protocol passes may refill for 6 months if within 3 months of last provider visit (or a total of 9 months).                 levothyroxine (SYNTHROID, LEVOTHROID) 200 MCG tablet [Pharmacy Med Name: LEVOTHYROXINE SODIUM 200MCG TABLET] 30 tablet 10     Si TAB BY MOUTH DAILY ON AN EMPTY STOMACH (DX: HYPOTHYROIDISM)       Thyroid Hormones Protocol Failed - 10/22/2020 12:25 PM        Failed - Provider visit in past 12 months or next 3 months     Last office visit with prescriber/PCP: 2018 Samuel Kovacs MD OR same dept: Visit date not found OR same specialty: 2019 Sabi Christie CNP  Last physical: Visit date not found Last  MTM visit: Visit date not found   Next visit within 3 mo: Visit date not found  Next physical within 3 mo: Visit date not found  Prescriber OR PCP: Samuel Kovacs MD  Last diagnosis associated with med order: There are no diagnoses linked to this encounter.  If protocol passes may refill for 12 months if within 3 months of last provider visit (or a total of 15 months).             Failed - TSH on file in past 12 months for patient age 12 & older     TSH   Date Value Ref Range Status   2017 3.99 0.30 - 5.00 uIU/mL Final                      perphenazine 8 MG tablet [Pharmacy Med Name: PERPHENAZINE 8MG TABLET] 60 tablet 10     Si TAB BY MOUTH TWICE DAILY (DX: SCHIZOPHRENIA)       There is no refill protocol information for this order        simvastatin (ZOCOR) 20 MG tablet [Pharmacy Med Name: SIMVASTATIN F/C 20MG TABLET] 30 tablet 10     Si TAB BY MOUTH EVERY EVENING (DX: HYPERLIPIDEMIA)       Statins Refill Protocol (Hmg CoA Reductase Inhibitors) Failed - 10/22/2020 12:25 PM        Failed - PCP or prescribing provider visit in past 12 months      Last office visit with prescriber/PCP: 2018 Samuel Kovacs MD OR same dept: Visit date not found OR same specialty: 2019 Sabi Christie, CNP  Last physical: Visit date not found Last MTM visit: Visit date not found   Next visit within 3 mo: Visit date not found  Next physical within 3 mo: Visit date not found  Prescriber OR PCP: Samuel Kovacs MD  Last diagnosis associated with med order: There are no diagnoses linked to this encounter.  If protocol passes may refill for 12 months if within 3 months of last provider visit (or a total of 15 months).

## 2021-06-12 NOTE — TELEPHONE ENCOUNTER
Spoke with patient who reports he is no longer seeing psychiatry. He reports he was previously seeing a psychiatrist through [facility] but no longer received services through this company.     Verified dose and instructions for these medications:   Benztropine 0.5 mg tablets take 1 tablet by mouth twice daily.   Perphenazine 8 mg take 1 tablet by mouth twice daily.       Are you able to fill a short term supply of these medications until patient can establish with a new psychiatrist?   He would like to go to [facility] for psychiatry care.

## 2021-06-12 NOTE — TELEPHONE ENCOUNTER
Small amount of the requested medications are sent to [facility] pharmacy.  Patient should be scheduled for an appointment to see me soon I have not seen him for 2 years.

## 2021-06-13 NOTE — PROGRESS NOTES
Patient ID: Landon Covarrubias is a 65 y.o. male.  /80   Pulse 87   Temp (!) 96.3  F (35.7  C)   Wt (!) 234 lb 1 oz (106.2 kg)   BMI 34.56 kg/m      Assessment/Plan:                   Diagnoses and all orders for this visit:    Impaired fasting glucose  -     Comprehensive Metabolic Panel    Hypothyroidism, unspecified type  -     Thyroid Cascade    Hypercholesterolemia  -     Comprehensive Metabolic Panel  -     Lipid Cascade RANDOM    Undifferentiated schizophrenia (H)  -     Comprehensive Metabolic Panel  -     HM2(CBC w/o Differential)    Spinal stenosis of lumbar region, unspecified whether neurogenic claudication present    Schizophrenia, chronic condition (H)  -     AMB REFERRAL TO MENTAL HEALTH AND ADDICTION  - Adult (18+); Psychiatry, ECT and Medication Management; Psychiatry; [facility] [phone]; We will contact you to schedule the appointment or please call with any ques...    Colon cancer screening  -     Ambulatory referral for Colonoscopy           DISCUSSION  Continue current medications.  Medication list is reviewed.  Reestablish care with urology as discussed below.  Schedule colonoscopy.  Check labs as above.  Based on laboratory testing decide if any changes are necessary.  Make referral to psychiatry as discussed below for management of chronic mental health concerns.  Subjective:     HPI    Landon Covarrubias is a 65 y.o. male resides in a care facility.  His medical history includes schizophrenia, acid reflux, hyperlipidemia, hypertension and significant back pain with neurologic impingement requiring transforaminal lumbar fusion in June 2016.  I have not seen him for 2 full years.    He resides at the [facility].  He has meal preparation, his medications are distributed and he has laundry service.  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  He reports this is going well.  Patient  reports he feels well he denies any acute health issues.    He has not seen a psychiatrist in approximately 1 year.  Patient does not have psychiatry services currently.  He has a complex mental health diagnosis with schizophrenia but no current issue on his current medications.  Discussed because of the complexity having him establish with a psychiatrist to help in ongoing assessment and management.    He has a history of a significant infection following the penis.  This took some time to heal 2 years ago.  Records are reviewed he reports no current concern.  He has seen urology up through January 2019.  He had routine prostate cancer screening as well as management of BPH undertaken through their office.  He has not had recent follow-up.  He states he has been contacted for follow-up and plans to do so.    With his history of back surgery and significant stenosis and neurologic impingement he reports that he is able to manage the pain through self-care specifically stretching exercises.  He reports no current concern.    He is due for colon cancer screening and willing to get this set up.    Reviewed immunizations he is up-to-date, could consider new shingles vaccine.  Could consider updating Pneumovax vaccine but have elected to wait until he turns 66 next year.        Review of Systems  Complete review of systems is obtained.  Other than the specific considerations noted above complete review of systems is negative.          Objective:   Medications:  Current Outpatient Medications   Medication Sig Note     benztropine (COGENTIN) 0.5 MG tablet Take 1 tablet (0.5 mg total) by mouth 2 (two) times a day.      folic acid (FOLVITE) 400 MCG tablet TAKE 1 TAB BY MOUTH ONCE DAILY FOR SUPPLEMENT 5/16/2017: Received from: External Pharmacy     glucosamine sulfate 500 mg Tab Take 1,500 mg by mouth. 5/16/2017: Received from: [facility] Received Sig: Take 1,500 mg by mouth once daily.      levothyroxine (SYNTHROID, LEVOTHROID) 200 MCG tablet 1 TAB BY MOUTH DAILY ON AN EMPTY STOMACH (DX: HYPOTHYROIDISM)      lisinopril (PRINIVIL,ZESTRIL) 5 MG tablet Take 5 mg by mouth daily. 2/20/2018: Received from: External Pharmacy Received Sig: TAKE 1 TAB BY MOUTH ONCE DAILY     metoprolol succinate (TOPROL-XL) 25 MG Take 25 mg by mouth. 5/16/2017: Received from: [facility] Received Sig: Take 1 tablet by mouth once daily. For hypertension     niacin 500 MG tablet Take 500 mg by mouth daily with breakfast.      OLANZapine (ZYPREXA) 20 MG tablet Take 20 mg by mouth bedtime.       omeprazole (PRILOSEC) 20 MG capsule Take 20 mg by mouth. 5/16/2017: Received from: [facility] Received Sig: Take 20 mg by mouth every 24 hours.     perphenazine 8 MG tablet Take 1 tablet (8 mg total) by mouth 2 (two) times a day.      polyethylene glycol (GLYCOLAX) 17 gram/dose powder Take 17 g by mouth daily.      SENEXON-S 8.6-50 mg tablet 1 TAB BY MOUTH TWICE DAILY (DX: CONSTIPATION)      simvastatin (ZOCOR) 20 MG tablet 1 TAB BY MOUTH EVERY EVENING (DX: HYPERLIPIDEMIA)      tamsulosin (FLOMAX) 0.4 mg cap 1 CAP BY MOUTH DAILY (DX: URINARY RETENTION)        Allergies:  No Known Allergies    Tobacco:   reports that he quit smoking about 18 years ago. He has a 20.00 pack-year smoking history. He has never used smokeless tobacco.     Physical Exam          /80   Pulse 87   Temp (!) 96.3  F (35.7  C)   Wt (!) 234 lb 1 oz (106.2 kg)   BMI 34.56 kg/m            General Appearance:    Alert, cooperative, no distress   Eyes:   No scleral icterus or conjunctival irritation       Lungs:     Clear to auscultation bilaterally, respirations unlabored, no wheezes or crackles   Heart:    Regular rate and rhythm,  No murmur   Abdomen:    Soft, no distention, no tenderness on palpation, no masses, no organomegaly small umbilical hernia     Extremities:  No edema, no joint  swelling or redness, no evidence of any injuries   Skin:  No concerning skin findings, no suspicious moles, no rashes, benign-appearing but rather large skin colored seborrheic keratosis on the left side parietal scalp.   Neurologic:  On gross examination there is no motor or sensory deficit.  Patient walks with a normal gait

## 2021-06-13 NOTE — TELEPHONE ENCOUNTER
RN cannot approve Refill Request    RN can NOT refill this medication med is not covered by policy/route to provider, Protocol failed and NO refill given and historical medication requested. Last office visit: 2018 Samuel Kovacs MD Last Physical: Visit date not found Last MTM visit: Visit date not found Last visit same specialty: 2019 Sabi Christie CNP.  Next visit within 3 mo: 2020 Samuel Kovacs MD  Next physical within 3 mo: Visit date not found      Su Rivera, [facility] Connection Triage/Med Refill 2020    Requested Prescriptions   Pending Prescriptions Disp Refills     OLANZapine (ZYPREXA) 20 MG tablet [Pharmacy Med Name: OLANZAPINE F/C 20MG TABLET] 30 tablet 8     Si TAB BY MOUTH DAILY (DX: SCHIZOPHRENIA)       There is no refill protocol information for this order        metoprolol succinate (TOPROL-XL) 25 MG [Pharmacy Med Name: METOPROLOL SUCC ER 25MG TAB ER 24H] 30 tablet 10     Si TAB BY MOUTH DAILY (DX: HYPERTENSION)       Beta-Blockers Refill Protocol Failed - 11/15/2020  5:05 PM        Failed - PCP or prescribing provider visit in past 12 months or next 3 months     Last office visit with prescriber/PCP: 2018 Samuel Kovacs MD OR same dept: Visit date not found OR same specialty: 2019 Sabi Christie CNP  Last physical: Visit date not found Last MTM visit: Visit date not found   Next visit within 3 mo: 2020 Samuel Kovacs MD  Next physical within 3 mo: Visit date not found  Prescriber OR PCP: Samuel Kovacs MD  Last diagnosis associated with med order: There are no diagnoses linked to this encounter.  If protocol passes may refill for 12 months if within 3 months of last provider visit (or a total of 15 months).             Failed - Blood pressure filed in past 12 months     BP Readings from Last 1 Encounters:   20 130/80                lisinopriL (PRINIVIL,ZESTRIL) 5 MG tablet [Pharmacy Med Name: LISINOPRIL 5MG TABLET] 30 tablet  10     Si TAB BY MOUTH DAILY (DX: HYPERTENSION)       Ace Inhibitors Refill Protocol Failed - 11/15/2020  5:05 PM        Failed - PCP or prescribing provider visit in past 12 months       Last office visit with prescriber/PCP: 2018 Samuel Kovacs MD OR same dept: Visit date not found OR same specialty: 2019 Sabi Christie, MINOR  Last physical: Visit date not found Last MTM visit: Visit date not found   Next visit within 3 mo: 2020 Samuel Kovacs MD  Next physical within 3 mo: Visit date not found  Prescriber OR PCP: Samuel Kovacs MD  Last diagnosis associated with med order: There are no diagnoses linked to this encounter.  If protocol passes may refill for 12 months if within 3 months of last provider visit (or a total of 15 months).             Failed - Serum Potassium in past 12 months     Lab Results   Component Value Date    Potassium 5.2 (H) 2020             Failed - Blood pressure filed in past 12 months     BP Readings from Last 1 Encounters:   20 130/80             Failed - Serum Creatinine in past 12 months     Creatinine   Date Value Ref Range Status   2020 1.28 0.70 - 1.30 mg/dL Final

## 2021-06-13 NOTE — TELEPHONE ENCOUNTER
Refill Approved    Rx renewed per Medication Renewal Policy. Medication was last renewed on 10/26/20.    Su Rivera, [facility] Connection Triage/Med Refill 2020     Requested Prescriptions   Pending Prescriptions Disp Refills     perphenazine 8 MG tablet [Pharmacy Med Name: PERPHENAZINE 8MG TABLET] 60 tablet 10     Si TAB by mouth twice daily  DXSP       There is no refill protocol information for this order        benztropine (COGENTIN) 0.5 MG tablet [Pharmacy Med Name: BENZTROPINE MESYLATE 0.5MG TABLET] 60 tablet 10     Si TAB BY MOUTH TWICE DAILY       Parkinson's Meds I Refill Protocol Passed - 2020  9:30 AM        Passed - PCP or prescribing provider visit in past 6 months      Last office visit with prescriber/PCP: 2020 OR same dept: 2020 Samuel Kovacs MD OR same specialty: 2020 Samuel Kovacs MD Last physical: Visit date not found Last MTM visit: Visit date not found     Next appt within 3 mo: Visit date not found  Next physical within 3 mo: Visit date not found  Prescriber OR PCP: Samuel Kovacs MD  Last diagnosis associated with med order: 1. Undifferentiated schizophrenia (H)  - perphenazine 8 MG tablet [Pharmacy Med Name: PERPHENAZINE 8MG TABLET]; 1 TAB by mouth twice daily  DXSP  Dispense: 60 tablet; Refill: 10  - benztropine (COGENTIN) 0.5 MG tablet [Pharmacy Med Name: BENZTROPINE MESYLATE 0.5MG TABLET]; 1 TAB BY MOUTH TWICE DAILY  Dispense: 60 tablet; Refill: 10    If protocol passes may refill for 6 months if within 3 months of last provider visit (or a total of 9 months).

## 2021-06-13 NOTE — PROGRESS NOTES
This video/telephone visit will be conducted via a call between you and your physician/provider. We have found that certain health care needs can be provided without the need for an in-person physical exam. This service lets us provide the care you need with a video /telephone conversation. If a prescription is necessary we can send it directly to your pharmacy. If lab work is needed we can place an order for that and you can then stop by our lab to have the test done at a later time.    Just as we bill insurance for in-person visits, we also bill insurance for video/telephone visits. If you have questions about your insurance coverage, we recommend that you speak with your insurance company.    Patient has given verbal consent for video/Telephone visit? yes  Patient would like the video visit invitation sent by: Text to cell phone: CAMRON Send to email: [email or web address]. or SIP CALL to:  CAMRON PATEL/KOKO: Slava SEAY LPN  Pt is new to us. He resides at the [facility].  Patient verified allergies, medications and pharmacy via phone. PHQ : 0 and PENNY: 1 done verbally with writer. Patient states he  is ready for visit.    : nothing to report    ________________________________________  Medications Phoned  to Pharmacy [] yes [x]no  Name of Pharmacist:  List Medications, including dose, quantity and instructions    Medications ordered this visit were e-scribed.  Verified by order class [x] yes  [] no  Cogentin, Zyprexa, Perphenazine  Medication changes or discontinuations were communicated to patient's pharmacy: [] yes  [x] no  New prescriptions reflect to discontinue all previous orders due to care transfer  Dictation completed at time of chart check: [] yes  [x] no    I have checked the documentation for today s encounters and the above information has been reviewed and completed.

## 2021-06-13 NOTE — PROGRESS NOTES
Patient ID: Landon Covarrubias is a 61 y.o. male.  /76  Temp 98.3  F (36.8  C)  Wt (!) 239 lb (108.4 kg)  BMI 34.29 kg/m2    Assessment/Plan:                   Diagnoses and all orders for this visit:    Skin lesion, infected    Penile edema    Other orders  -     Influenza, Seasonal,Quad Inj, 36+ MOS          DISCUSSION  There are superficial skin lesions of unknown etiology.  I am very concerned that there is a secondary infection process and may even be an abscess within the soft tissue of the penile shaft itself.  There is an impressive amount of edema causing tortuosity of the penile shaft.  There appears to be some element of skin breakdown into the scrotal skin in some regions as well.  There is no discharge from the urethra.  We have made arrangements for him to be seen at [facility] urology this afternoon to have this further evaluated.  Subjective:     HPI    Landon Covarrubias is a 61-year-old man whose medical history is significant for hypertension, hyperlipidemia, hypothyroidism and schizophrenia who is here today concerned regarding swelling in the groin region specifically on the penis itself.  Patient states that about 4 weeks ago he noticed that there was swelling and redness near the tip of the penis.  This progressed to having more areas of redness, having open areas of skin where there is serous to slightly bloody drainage with increased swelling.  He states he has never had a similar problem before.  States he is still able to urinate.  States the area is uncomfortable.  He has a persistent serous drainage which often dries causing skin around the groin region to stick to itself causing even more discomfort.  He denies fevers chills or other systemic signs of infection.    Review of Systems  Complete review of systems is obtained.  Other than the specific considerations noted above complete review of systems is negative.          Objective:   Medications:  Current Outpatient Prescriptions    Medication Sig Note     acetaminophen (TYLENOL) 325 MG tablet Take 650 mg by mouth. 5/16/2017: Received from: [facility] Received Sig: Take 650 mg by mouth every 4 hours if needed (pain). Max acetaminophen dose: 4000mg in 24 hrs.     acetaminophen (TYLENOL) 500 MG tablet Take 500 mg by mouth every 4 (four) hours as needed for pain.      benztropine (COGENTIN) 0.5 MG tablet Take 0.5 mg by mouth. 5/16/2017: Received from: [facility] Received Sig: Take 1 tablet by mouth 2 times daily. For extrapyramidal side effects     docoshexanoic acid-eicosapent 500 mg (FISH OIL) 500-100 mg cap capsule Take 500 mg by mouth daily.      folic acid (FOLVITE) 400 MCG tablet TAKE 1 TAB BY MOUTH ONCE DAILY FOR SUPPLEMENT 5/16/2017: Received from: External Pharmacy     glucosamine sulfate 500 mg cap Take 1,500 mg by mouth daily.      glucosamine sulfate 500 mg Tab Take 1,500 mg by mouth. 5/16/2017: Received from: [facility] Received Sig: Take 1,500 mg by mouth once daily.     haloperidol (HALDOL) 5 MG tablet Take 2.5 mg by mouth. 5/16/2017: Received from: [facility] Received Sig: Take 0.5 tablets by mouth every 6 hours if needed for Agitation.     levothyroxine (SYNTHROID, LEVOTHROID) 137 MCG tablet Take 137 mcg by mouth daily.      levothyroxine (SYNTHROID, LEVOTHROID) 200 MCG tablet TAKE 1 TAB BY MOUTH ONCE DAILY 5/16/2017: Received from: External Pharmacy     melatonin 3 mg Tab tablet Take 6 mg by mouth. 5/16/2017: Received from: [facility] Received Sig: Take 2 tablets by mouth at bedtime if needed for Sleep.     metoprolol succinate (TOPROL-XL) 25 MG Take 25 mg by mouth. 5/16/2017: Received from: [facility] Received Sig: Take 1 tablet by mouth once daily. For hypertension     metoprolol tartrate (LOPRESSOR) 25 MG tablet Take 1  tablet (25 mg total) by mouth 2 (two) times a day.      MULTIVITAMIN ORAL Take 1 tablet by mouth daily.       naproxen (NAPROSYN) 250 MG tablet TAKE 1 TAB BY MOUTH TWICE DAILY AS NEEDED 9/29/2017: Received from: External Pharmacy     niacin 500 MG tablet Take 500 mg by mouth daily with breakfast.      OLANZapine (ZYPREXA) 20 MG tablet Take 20 mg by mouth bedtime.       omega-3 fatty acids 500 mg cap Take 1 capsule by mouth. 5/16/2017: Received from: [facility] Received Sig: Take 1 capsule by mouth once daily.     omeprazole (PRILOSEC) 20 MG capsule Take 20 mg by mouth. 5/16/2017: Received from: [facility] Received Sig: Take 20 mg by mouth every 24 hours.     perphenazine 8 MG tablet Take 24 mg by mouth 2 (two) times a day.      polyethylene glycol (GLYCOLAX) 17 gram/dose powder DISSOLVE 17 GMS IN WATER AND TAKE BY MOUTH ONCE DAILY FOR CONSTIPATION 5/16/2017: Received from: External Pharmacy     polyethylene glycol (MIRALAX) 17 gram packet Take 17 g by mouth daily.      QUEtiapine (SEROQUEL) 50 MG tablet Take 50 mg by mouth 3 (three) times a day as needed.      senna-docusate (PERICOLACE) 8.6-50 mg tablet Take 1 tablet by mouth 2 (two) times a day. Take daily for constipation or while on narcotics. Hold if developing loose stools      simvastatin (ZOCOR) 20 MG tablet Take 20 mg by mouth bedtime.      tamsulosin (FLOMAX) 0.4 mg Cp24 Take 0.4 mg by mouth. 5/16/2017: Received from: [facility] Received Sig: Take 1 capsule by mouth once daily after a meal.       Allergies:  No Known Allergies    Tobacco:   reports that he has quit smoking. He does not have any smokeless tobacco history on file.     Physical Exam          /76  Temp 98.3  F (36.8  C)  Wt (!) 239 lb (108.4 kg)  BMI 34.29 kg/m2      General: Patient is alert no signs of distress    : There is redness and obvious swelling throughout the entire  penile shaft making it somewhat tortuous in its appearance.  There is superficial skin lesions there is no blisterlike lesions but appear to be almost like superficial ulcerations which in themselves are not necessarily painful, I do have somewhat of a resemblance to an ulceration but are not very deep.  There is obvious serous drainage on his undergarments.  There is no obvious purulent material.  There is swelling near the distal end of the shaft of the penis may just be edematous tissue but there is some concern that there could be abscess.

## 2021-06-13 NOTE — TELEPHONE ENCOUNTER
RN cannot approve Refill Request    RN can NOT refill this medication med is not covered by policy/route to provider. Last office visit: 2020 Samuel Kovacs MD Last Physical: Visit date not found Last MTM visit: Visit date not found Last visit same specialty: 2020 Samuel Kovacs MD.  Next visit within 3 mo: Visit date not found  Next physical within 3 mo: Visit date not found      Su Rivera, [facility] Connection Triage/Med Refill 2020    Requested Prescriptions   Pending Prescriptions Disp Refills     perphenazine 8 MG tablet [Pharmacy Med Name: PERPHENAZINE 8MG TABLET] 60 tablet 10     Si TAB by mouth twice daily  DXSP       There is no refill protocol information for this order      Signed Prescriptions Disp Refills    benztropine (COGENTIN) 0.5 MG tablet 60 tablet 5     Si TAB BY MOUTH TWICE DAILY       Parkinson's Meds I Refill Protocol Passed - 2020  9:30 AM        Passed - PCP or prescribing provider visit in past 6 months      Last office visit with prescriber/PCP: 2020 OR same dept: 2020 Samuel Kovacs MD OR same specialty: 2020 Samuel Kovacs MD Last physical: Visit date not found Last MTM visit: Visit date not found     Next appt within 3 mo: Visit date not found  Next physical within 3 mo: Visit date not found  Prescriber OR PCP: Samuel Kovacs MD  Last diagnosis associated with med order: 1. Undifferentiated schizophrenia (H)  - perphenazine 8 MG tablet [Pharmacy Med Name: PERPHENAZINE 8MG TABLET]; 1 TAB by mouth twice daily  DXSP  Dispense: 60 tablet; Refill: 10  - benztropine (COGENTIN) 0.5 MG tablet; 1 TAB BY MOUTH TWICE DAILY  Dispense: 60 tablet; Refill: 5    If protocol passes may refill for 6 months if within 3 months of last provider visit (or a total of 9 months).

## 2021-06-14 NOTE — PROGRESS NOTES
"Telemedicine Visit: The patient's condition can be safely assessed and treated via synchronous audio and visual telemedicine encounter.      Reason for Telemedicine Visit: Patient unable to travel    Originating Site (Patient Location): Patient's home    Distant Site (Provider Location): Provider Remote Setting- Home Office    Consent:  The patient/guardian has verbally consented to: the potential risks and benefits of telemedicine (video visit) versus in person care; bill my insurance or make self-payment for services provided; and responsibility for payment of non-covered services.     Mode of Communication:  Video Conference via [employer]    As the provider I attest to compliance with applicable laws and regulations related to telemedicine.  Outpatient Psychiatric Consultation     Referral Source:   Samuel Kovacs    --  Chief Complaint: \"I haven't seen a psychiatrist in a year and need someone to refill my medication\"     --  History of Present Illness / Client Impression of Mental Health Concerns   Landon Covarrubias is a 65 y.o. male who presents for initiation of care. Referred by Samuel Kovacs for evaluation of mental health.    Reports feeling \"really good\" on his current medication combination. Denies depression and anxiety. No current AV hallucinations. Describes hearing \"voices a lot\" starting in 1980 and shares they have been gone since 1999. History of suicide attempts and hospitalizations in 1990s when \"avoiding\" the psychiatrist outpatient. Reports avoiding visits at that time after receiving recommendation to pursue ECT. Denies ever following through with recommendation. Feeling stable since 1999. Saw psychiatrist through [facility] and then more recently met with provider through [facility] that would round in assisted living facility. Has not seen specialist in over 1 year and recommended to reestablish by PCP. History of thorazine treatment with body aches and \"I didn't like " "taking it\" but unable to recall others in past. Tells writer he is not familiar with current medications as facility staff manage his medications now. Denies side effects outside of medication induced constipation which he feels is relived through OTC medications.    Lives at [facility]. 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 Guesstimates 20 hours a week. Has new  \"that just checks on my welfare\".     --  Psychiatric Review of Systems    Mood: \"good\"  o Depression: no   o Nichol no    Impaired Sleep: no    Impaired Energy: no    Change of Interest/Anhedonia: no    Appetite/Weight Changes: no    Concentration Changes: no    Negative cognitions of self: no    Tearfulness: no    Anxiety/Panic: no     Thoughts of self harm or suicide: no    Thoughts of harming others: no    Psychosis:  no    --  Psychiatric History     Current psychiatrist  Establishing care today    Current psychotherapist  n/a    Current   yes    Past Documented   Psychiatric/SUDs Diagnoses    Specialty Problems        Psychiatry Problems    Undifferentiated schizophrenia (H)        Mood disorder due to a general medical condition        Schizophrenia, chronic condition (H)        Dementia praecox (H)              Hospitalizations  two psychiatric admissions in 1990s.    Suicide attempts  attempted suicide in 1992 and 1999. First attempted overdose on ibuprofen. Second attempt he tried to fall asleep in front of garbage truck.    Past medication trials & Results  thorazine- discontinued due to side effects   unable to recall others    Electroconvulsive therapy  n/a    Guardianship/Power of /Conservator  n/a    Judicial commitments  n/a     --  Recent Substance Use   reports no history of alcohol use.   reports no history of drug use.   reports that he quit smoking about 18 years ago. He has a 20.00 pack-year smoking history. He has never used smokeless tobacco.  reports " "previous caffeine use. 2 cups of coffee in the morning.    --  Complicated Withdrawal Syndromes  None reported. No known seizure history.     --  Prior Substance Abuse Treatments  None reported    --  Birth & Development History  City and state of birth: [place], [place].  Living circumstances: with family of origin. Parents and sister with learning disability. Parents  at 8 years old. Mother remarried step father who was short tempered and \"a beer drinker\". Step-father adopted him and sister. Estranged from biologic father until 2010 due to not paying support. Worked full time job throughout highschool.   Somatic growth compared to peers: normal so far as aware.  Academic performance in elementary school: The patient reports no history of problems with learning or school.  Highest education achieved: when to high school but did not have enough credits to graduate in 12th grade    --  Trauma & Abuse History  Major accidents and injuries: n/a.  Concussions or traumatic brain injury: n/a.    Sexual/physical/emotional abuse/trauma:  The patient denies a history of abuse or trauma.    --  Spiritual History  Sources of hope, meaning, comfort, strength, peace and love: music and following politics.  Part of an organized Jewish: The patient reports no particular Jewish affiliation or involvement.    --  Social History  Marital status: has never been .  Sexual orientation: was not asked about sexual orientation.  Number of children: 0    Current living circumstances: The patient lives alone.  Employment status: unemployed.   Current sources of financial support: SSDI.    Social supports: family and care team.  Hobbies/interests: The patient reports the following hobbies and interests:  politics and music    --   History  Denied  service.    --  Legal History  The patient has a history of legal problems. Filed bankruptcy in 1999.    --  Past Medical History  Primary Care Provider: Fermín" Samuel GRANDE MD    Medical History:  has a past medical history of Acute Renal Insufficiency, Acute Renal Insufficiency, Altered mental state (6/23/2016), Cellulitis of corpus cavernosum and penis (2018), Chronic kidney disease, Delirium (6/23/2016), Dementia praecox (H) (5/16/2017), Disease of thyroid gland, Elevated blood pressure, Encephalopathy acute, Essential hypertension, Hematuria (6/24/2016), Hypercholesterolemia, Hyperlipidemia, Hypokalemia (7/5/2016), Hypothyroidism, Hypothyroidism, Impaired fasting glucose, Lateral epicondylitis, Lumbar spinal stenosis (6/8/2015), Mood disorder due to a general medical condition, 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     --  Family History  family history includes Kidney disease in his father; No Medical Problems in his sister; Parkinsonism in his mother.    --  Surgical History   has a past surgical history that includes Tonsillectomy; Lumbar laminectomy (Bilateral, 6/8/2015); Back surgery; and [place] tooth extraction.    --  Pain Medicine History  Has never been involved in a pain clinic.     --  [place] Prescription Monitoring Program  No worrisome pharmacy activity.     --  Clinical Outcomes Measures:  PHQ-9 Total Score: 0  PENNY-7: 1  DISCUS: unable to assess via virtual visit. Will ask completion next in clinic visit    --  Current Medications:  Current Outpatient Medications   Medication Sig Dispense Refill     benztropine (COGENTIN) 0.5 MG tablet 1 TAB BY MOUTH TWICE DAILY 60 tablet 5     folic acid (FOLVITE) 400 MCG tablet TAKE 1 TAB BY MOUTH ONCE DAILY FOR SUPPLEMENT  11     glucosamine sulfate 500 mg Tab Take 1,500 mg by mouth.       levothyroxine  (SYNTHROID, LEVOTHROID) 200 MCG tablet 1 TAB BY MOUTH DAILY ON AN EMPTY STOMACH (DX: HYPOTHYROIDISM) 30 tablet 10     lisinopriL (PRINIVIL,ZESTRIL) 5 MG tablet 1 TAB BY MOUTH DAILY (DX: HYPERTENSION) 30 tablet 10     metoprolol succinate (TOPROL-XL) 25 MG 1 TAB BY MOUTH DAILY (DX: HYPERTENSION) 30 tablet 10     niacin 500 MG tablet Take 500 mg by mouth daily with breakfast.       OLANZapine (ZYPREXA) 20 MG tablet 1 TAB BY MOUTH DAILY (DX: SCHIZOPHRENIA) 30 tablet 8     perphenazine 8 MG tablet 1 TAB by mouth twice daily  DXSP 60 tablet 10     polyethylene glycol (GLYCOLAX) 17 gram/dose powder Take 17 g by mouth daily. 850 g 3     SENEXON-S 8.6-50 mg tablet 1 TAB BY MOUTH TWICE DAILY (DX: CONSTIPATION) 100 tablet 11     simvastatin (ZOCOR) 20 MG tablet 1 TAB BY MOUTH EVERY EVENING (DX: HYPERLIPIDEMIA) 30 tablet 10     omeprazole (PRILOSEC) 20 MG capsule Take 20 mg by mouth.       tamsulosin (FLOMAX) 0.4 mg cap 1 CAP BY MOUTH DAILY (DX: URINARY RETENTION) 30 capsule 11     No current facility-administered medications for this visit.        --  Allergies  No Known Allergies  --  Summary of Diagnostic Studies  No visits with results within 30 Day(s) from this visit.   Latest known visit with results is:   Office Visit on 11/16/2020   Component Date Value Ref Range Status     Sodium 11/16/2020 131* 136 - 145 mmol/L Final     Potassium 11/16/2020 5.2* 3.5 - 5.0 mmol/L Final     Chloride 11/16/2020 99  98 - 107 mmol/L Final     CO2 11/16/2020 25  22 - 31 mmol/L Final     Anion Gap, Calculation 11/16/2020 7  5 - 18 mmol/L Final     Glucose 11/16/2020 102  70 - 125 mg/dL Final     BUN 11/16/2020 12  8 - 22 mg/dL Final     Creatinine 11/16/2020 1.28  0.70 - 1.30 mg/dL Final     GFR MDRD Af Amer 11/16/2020 >60  >60 mL/min/1.73m2 Final     GFR MDRD Non Af Amer 11/16/2020 57* >60 mL/min/1.73m2 Final     Bilirubin, Total 11/16/2020 0.5  0.0 - 1.0 mg/dL Final     Calcium 11/16/2020 9.5  8.5 - 10.5 mg/dL Final     Protein, Total  11/16/2020 6.8  6.0 - 8.0 g/dL Final     Albumin 11/16/2020 4.0  3.5 - 5.0 g/dL Final     Alkaline Phosphatase 11/16/2020 118  45 - 120 U/L Final     AST 11/16/2020 24  0 - 40 U/L Final     ALT 11/16/2020 32  0 - 45 U/L Final     WBC 11/16/2020 8.7  4.0 - 11.0 thou/uL Final     RBC 11/16/2020 4.91  4.40 - 6.20 mill/uL Final     Hemoglobin 11/16/2020 15.0  14.0 - 18.0 g/dL Final     Hematocrit 11/16/2020 44.6  40.0 - 54.0 % Final     MCV 11/16/2020 91  80 - 100 fL Final     MCH 11/16/2020 30.6  27.0 - 34.0 pg Final     MCHC 11/16/2020 33.7  32.0 - 36.0 g/dL Final     RDW 11/16/2020 12.2  11.0 - 14.5 % Final     Platelets 11/16/2020 288  140 - 440 thou/uL Final     MPV 11/16/2020 6.8* 7.0 - 10.0 fL Final     Cholesterol 11/16/2020 165  <=199 mg/dL Final     Triglycerides 11/16/2020 90  <=149 mg/dL Final     HDL Cholesterol 11/16/2020 53  >=40 mg/dL Final     LDL Calculated 11/16/2020 94  <=129 mg/dL Final     Patient Fasting > 8hrs? 11/16/2020 No   Final     TSH 11/16/2020 0.11* 0.30 - 5.00 uIU/mL Final     Free T4 11/16/2020 1.7  0.7 - 1.8 ng/dL Final       --  Review of Systems  Wt Readings from Last 3 Encounters:   11/16/20 (!) 234 lb 1 oz (106.2 kg)   05/14/19 (!) 230 lb 9.6 oz (104.6 kg)   02/11/19 (!) 226 lb (102.5 kg)     Temp Readings from Last 3 Encounters:   11/16/20 (!) 96.3  F (35.7  C)   02/11/19 98.3  F (36.8  C) (Oral)   01/28/19 98.7  F (37.1  C)     BP Readings from Last 3 Encounters:   11/16/20 130/80   05/14/19 122/62   02/11/19 125/70     Pulse Readings from Last 3 Encounters:   11/16/20 87   05/14/19 80   02/11/19 77      There were no vitals taken for this visit. unable to assess today Pain Score: n/a  Pain Location: n/a     As noted in the subjective section above, otherwise a 10 point review of systems is negative. Limited ability to assess given virtual nature of visit. Review of symptoms based entirely on patient's verbal report and what writer is able to assess via camera if used during  "appointment.    --  Mental Status Examination    Appearance: appears stated age, clean, well groomed, casually dressed appropriate for weather   Orientation: Patient alert and oriented to person, place, time, and situation  Reliability:  Patient appears to be an adequate historian.   Behavior: Patient makes good eye contact and engages with normal rapport in the interview.   There is no evidence of responding to hallucinations or flashbacks.  Speech: Speech is spontaneous and coherent, with a normal rate, rhythm and tone.    Language: There are no difficulties with expressive or receptive language as observed throughout the interview.    Mood: Described as \"good\".    Affect: Congruent and shows a normal range and level of reactivity.  Judgement: Able to make basic decision regarding safety.  Insight: Good awareness of physical and mental health conditions and aware of needs around care for these.  Gait and station: unable to assess   Thought process: Logical   Thought content: No evidence of delusions or paranoia.  No thoughts of self harm or suicide. No thoughts of harming others.  Associations: Connected  Fund of knowledge: Average  Attention / Concentration: Able to remain focused during the interview with minimal distractibility or need for redirection.  Short Term Memory: Grossly intact as evidence by client recalling themes and ideas discussed.  Long Term Memory: Intact  Motor Status: No recent apparent change.  No current tremor.    --  Diagnostic Impression:  1. Schizophrenia, chronic condition (H)  - benztropine (COGENTIN) 0.5 MG tablet; Take 1 tablet (0.5 mg total) by mouth 2 (two) times a day.  Dispense: 60 tablet; Refill: 2  - OLANZapine (ZYPREXA) 20 MG tablet; Take 1 tablet (20 mg total) by mouth at bedtime.  Dispense: 30 tablet; Refill: 2  - perphenazine 8 MG tablet; Take 1 tablet (8 mg total) by mouth 2 (two) times a day.  Dispense: 60 tablet; Refill: 2    --  Medical Decision-Making   Landon OLSEN " Satish is a 65 y.o. male who presents for initiation of care. Information today obtained from patient's verbal report and review of records in EHR. Referred by Samuel Kovacs for evaluation of mental health. Previously followed by provider through [facility] and previously [facility]. 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 Currently residing at [facility]. Has . Past medication trials include, but are not limited to: thorazine and unknown.    Reported stability on current medication combinations. Discussed neuroleptic consent form and obtained verbal consent. No overt or reported signs of adverse medication effects outside of occasional constipation which he reports is managable. Obtained PAMELA for past care location and current residence. Obtained consent to communicate for . Reviewed recent lab work. No new labs indicated at this time. Due for DISCUS evaluation next in clinic visit which we discussed.     Moderate risk. Patient denies suicidal and homicidal ideation. History of 2 suicide attempts in 1990s. Not at imminent risk this visit. Educated on need to seek emergent services should they become a risk to themselves or others. Landon Covarrubias verbalized understanding and agreement with this safety plan.    --  Plan  1. Continue to monitor for  safety  2. Current Medications  1. Continue benztropine 0.5mg two times a day for EPS prevention  2. Continue olanzapine 20mg daily for psychosis and mood stabilization  3. Continue perphenazine 8mg daily for psychosis and mood stabilization  4. Neuroleptic Consent Form Signed: verbal consent obtained 12/22/20  5. REFILLS: see above  1. Labs ordered this visit: n/a   2. Consider individual psychotherapy appointments for mood stabilization and nonpharmacologic coping. Collaborate with interdisciplinary care team as needed.  3. Release of Information:   1. [facility] in [place], [place] verbal consent obtained 12/22/20  2. [facility] verbal consent obtained 12/22/20  4. Consent to Communicate:  verbal consent obtained 12/22/20  3. Patient will continue abstinence from drugs and alcohol  4. Patient to return to clinic in 3 months for evaluation of medication trials and continued assessment. Ongoing patient psychoeducation regarding chronic illness and treatment.   1. Patient educated that they may schedule sooner appointment or contact writer for any worsening or lack of improvement in symptoms.   5. I reviewed the potential risks, side effects, and benefits of all medications with the patient. Patient verbalized understanding and was encouraged to call clinic with further questions or concerns.    --  START TIME: 1:00 PM  END TIME: 2:00 PM    Appointment duration: 60 minutes with > 75% spent on coordination of care and psycho-education regarding illness, symptoms, neurobiological basis of disease, substance use, alternative interventions, sleep hygiene, safety planning, care planning, and pharmacology.    Dr. Nydia Ward, DNP, APRN, PMHNP-BC  Nurse Practitioner - Psychiatry    This medical report was made using Dragon Dictation. Spelling and grammatical errors with Dragon exist and are not intentional.

## 2021-06-14 NOTE — PATIENT INSTRUCTIONS - HE
"1. Continue medications as prescribed  2. Have your pharmacy contact us for a refill if you are running low on medications (We may ask you to come into clinic to get a refill from the nurse)  3. No alcohol or drug use  4. No driving if sedated  5. Contact the clinic with any questions or concerns   a. Phone: [phone]  b. Fax: [phone]  6. Reach out for help if you feel like hurting yourself or others:   a. [facility]: [address] [place] [address], [address] (phone: [phone])  b. [facility] Suicide Hotline: [phone]   c. Crisis Texting Line: Text \"[place]\" to [phone]  d. Call 911 or go to nearest Emergency room   7. Follow up as directed in 3 months by video for your appointment    "

## 2021-06-14 NOTE — TELEPHONE ENCOUNTER
RN cannot approve Refill Request    RN can NOT refill this medication medication not on med list. Last office visit: 2020 Samuel Kovacs MD Last Physical: Visit date not found Last MTM visit: Visit date not found Last visit same specialty: 2020 Samuel Kovacs MD.  Next visit within 3 mo: Visit date not found  Next physical within 3 mo: Visit date not found      Sofya Qureshi, [facility] Triage/Med Refill 2020    Requested Prescriptions   Pending Prescriptions Disp Refills     famotidine (PEPCID) 20 MG tablet [Pharmacy Med Name: FAMOTIDINE 20MG TABLET] 30 tablet 10     Si TAB BY MOUTH EVERY BEDTIME       GI Medications Refill Protocol Passed - 2020 12:15 PM        Passed - PCP or prescribing provider visit in last 12 or next 3 months.     Last office visit with prescriber/PCP: 2020 Samuel Kovacs MD OR same dept: 2020 Samuel Kovacs MD OR same specialty: 2020 Samuel Kovacs MD  Last physical: Visit date not found Last MTM visit: Visit date not found   Next visit within 3 mo: Visit date not found  Next physical within 3 mo: Visit date not found  Prescriber OR PCP: Samuel Kovacs MD  Last diagnosis associated with med order: There are no diagnoses linked to this encounter.  If protocol passes may refill for 12 months if within 3 months of last provider visit (or a total of 15 months).

## 2021-06-14 NOTE — TELEPHONE ENCOUNTER
Tulio from [facility] is calling to check the status of paperwork faxed.     The paperwork is: Annual re certification orders.       This will be faxed again to [phone].    [facility]'s telephone number is [phone]

## 2021-06-15 ENCOUNTER — COMMUNICATION - HEALTHEAST (OUTPATIENT)
Dept: BEHAVIORAL HEALTH | Facility: CLINIC | Age: 66
End: 2021-06-15
Payer: MEDICARE

## 2021-06-15 PROBLEM — F20.9: Status: ACTIVE | Noted: 2017-05-16

## 2021-06-15 NOTE — TELEPHONE ENCOUNTER
RN cannot approve Refill Request    RN can NOT refill this medication med is not covered by policy/route to provider. Last office visit: 2020 Samuel Kovacs MD Last Physical: Visit date not found Last MTM visit: Visit date not found Last visit same specialty: 2020 Samuel Kovacs MD.  Next visit within 3 mo: Visit date not found  Next physical within 3 mo: Visit date not found      Cesia Ocampo, [facility] Triage/Med Refill 2021    Requested Prescriptions   Pending Prescriptions Disp Refills     folic acid (FOLVITE) 400 MCG tablet [Pharmacy Med Name: FOLIC ACID 0.4MG TABLET] 100 tablet 2     Si TAB BY MOUTH DAILY (DX: DEFICIENCY)       There is no refill protocol information for this order

## 2021-06-16 PROBLEM — K21.9 GASTROESOPHAGEAL REFLUX DISEASE: Status: ACTIVE | Noted: 2020-12-22

## 2021-06-16 PROBLEM — K59.03 DRUG-INDUCED CONSTIPATION: Status: ACTIVE | Noted: 2020-12-22

## 2021-06-16 PROBLEM — N18.30 STAGE 3 CHRONIC KIDNEY DISEASE (H): Status: ACTIVE | Noted: 2020-12-22

## 2021-06-16 PROBLEM — N48.5: Status: ACTIVE | Noted: 2018-02-21

## 2021-06-16 PROBLEM — R26.81 UNSTEADY GAIT: Status: ACTIVE | Noted: 2020-12-22

## 2021-06-16 PROBLEM — N47.6 BALANOPOSTHITIS: Status: ACTIVE | Noted: 2020-12-22

## 2021-06-16 PROBLEM — M54.50 LOW BACK PAIN: Status: ACTIVE | Noted: 2020-12-22

## 2021-06-16 PROBLEM — N40.1 LOWER URINARY TRACT SYMPTOMS DUE TO BENIGN PROSTATIC HYPERPLASIA: Status: ACTIVE | Noted: 2020-12-22

## 2021-06-16 PROBLEM — L60.2 ONYCHOGRYPHOSIS: Status: ACTIVE | Noted: 2020-12-22

## 2021-06-16 PROBLEM — Z79.899 OTHER LONG TERM (CURRENT) DRUG THERAPY: Status: ACTIVE | Noted: 2020-12-22

## 2021-06-16 NOTE — TELEPHONE ENCOUNTER
Date of Last Office Visit: 12/22/20  Date of Next Office Visit: 3/23/21  No shows since last visit: none  Cancellations since last visit: none    Medication requested: benztropine 0.5mg Date last ordered: 12/22/20 Qty: 60 Refills: 2  Medication requested: olanzapine 20mg Date last ordered: 12/22/20 Qty: 30 Refills: 2  Medication requested: perphenazine 8mg Date last ordered: 12/22/20 Qty: 60 Refills: 2       Lapse in medication adherence greater than 5 days?: no  If yes, call patient and gather details: N/A  Medication refill request verified as identical to current order?: yes  Result of Last DAM, VPA, Li+ Level, CBC, or Carbamazepine Level (at or since last visit): N/A    []Medication refilled per  Medication Refill in Ambulatory Care  policy.  [x]Medication unable to be refilled by RN due to criteria not met as indicated below:    []Eligibility - not seen in the last year   []Supervision - no future appointment   []Compliance - no shows, cancellations or lapse in therapy   []Verification - order discrepancy   []Controlled medication   []Medication not included in policy   []90-day supply request   [x]Other - requesting early

## 2021-06-16 NOTE — PROGRESS NOTES
"Telemedicine Visit: The patient's condition can be safely assessed and treated via synchronous audio and visual telemedicine encounter.      Reason for Telemedicine Visit: Patient unable to travel    Originating Site (Patient Location): Patient's home    Distant Site (Provider Location): Provider Remote Setting- Home Office    Consent:  The patient/guardian has verbally consented to: the potential risks and benefits of telemedicine (video visit) versus in person care; bill my insurance or make self-payment for services provided; and responsibility for payment of non-covered services.     Mode of Communication:  Video Conference via [employer]    As the provider I attest to compliance with applicable laws and regulations related to telemedicine.  Outpatient Psychiatric Follow Up    Date of Service: 4/16/2021    --  Chief Complaint: \"things are going good\"     --  History of Present Illness/Client Impression of Mental Health Consult:    Landon Covarrubias is a 65 y.o. male who presents for follow up appointment. Last visit occurred 12/22/2021 which was initial appointment.      Tells writer he started new job working for [email or web address] which he is enjoying. Passive suicidal ideation with no plan, intent, or motivation. Despite high PHQ-9 and PENNY-7 denies depression and anxiety and tells writer \"I must of messed up\" in reference to numeric values associated with scoring tools. Tells writer he needs to transfer to new providers    States mood is \"good\". Rates depression and anxiety manageable. No sleep or energy maintenance concerns. No appetite or weight concerns. No suicidal and homicidal ideation. No overt psychosis. Denies all other psychiatric symptoms. No new physical concerns.     Medication adherence: Reviewed risk/benefits of medication , Patient able to verbalize understanding of side effects  and Patient verbally consents to taking medications  Medication side effects: none  The patient was given information on " medications: currently prescribed    --  [place] Prescription Monitoring Program  No worrisome pharmacy activity.     --  Clinical Outcomes Measures:  PHQ-9 Total Score: 27  PENNY-7: 21  DISCUS: SANDRA. Request completion at soonest availability for in clinic appointment. Denies symptoms of EPS/TD.      --  Current Medications:  Current Outpatient Medications   Medication Sig Dispense Refill     benztropine (COGENTIN) 0.5 MG tablet TAKE 1 TABLET (0.5 MG TOTAL) BY MOUTH 2 (TWO) TIMES A DAY. 60 tablet 2     famotidine (PEPCID) 20 MG tablet 1 TAB BY MOUTH EVERY BEDTIME 30 tablet 10     folic acid (FOLVITE) 400 MCG tablet 1 TAB BY MOUTH DAILY (DX: DEFICIENCY) 100 tablet 2     glucosamine sulfate 500 mg Tab Take 1,500 mg by mouth.       levothyroxine (SYNTHROID, LEVOTHROID) 200 MCG tablet 1 TAB BY MOUTH DAILY ON AN EMPTY STOMACH (DX: HYPOTHYROIDISM) 30 tablet 10     lisinopriL (PRINIVIL,ZESTRIL) 5 MG tablet 1 TAB BY MOUTH DAILY (DX: HYPERTENSION) 30 tablet 10     metoprolol succinate (TOPROL-XL) 25 MG 1 TAB BY MOUTH DAILY (DX: HYPERTENSION) 30 tablet 10     niacin 500 MG tablet Take 500 mg by mouth daily with breakfast.       OLANZapine (ZYPREXA) 20 MG tablet TAKE 1 TABLET (20 MG TOTAL) BY MOUTH AT BEDTIME. 30 tablet 2     omeprazole (PRILOSEC) 20 MG capsule Take 20 mg by mouth.       perphenazine 8 MG tablet TAKE 1 TABLET (8 MG TOTAL) BY MOUTH 2 (TWO) TIMES A DAY. 60 tablet 2     polyethylene glycol (GLYCOLAX) 17 gram/dose powder Take 17 g by mouth daily. 850 g 3     SENEXON-S 8.6-50 mg tablet 1 TAB BY MOUTH TWICE DAILY (DX: CONSTIPATION) 100 tablet 11     simvastatin (ZOCOR) 20 MG tablet 1 TAB BY MOUTH EVERY EVENING (DX: HYPERLIPIDEMIA) 30 tablet 10     tamsulosin (FLOMAX) 0.4 mg cap 1 CAP BY MOUTH DAILY (DX: URINARY RETENTION) 30 capsule 11     No current facility-administered medications for this visit.        --  Allergies  No Known Allergies  --  Summary of Diagnostic Studies  No visits with results within 30 Day(s)  from this visit.   Latest known visit with results is:   Office Visit on 11/16/2020   Component Date Value Ref Range Status     Sodium 11/16/2020 131* 136 - 145 mmol/L Final     Potassium 11/16/2020 5.2* 3.5 - 5.0 mmol/L Final     Chloride 11/16/2020 99  98 - 107 mmol/L Final     CO2 11/16/2020 25  22 - 31 mmol/L Final     Anion Gap, Calculation 11/16/2020 7  5 - 18 mmol/L Final     Glucose 11/16/2020 102  70 - 125 mg/dL Final     BUN 11/16/2020 12  8 - 22 mg/dL Final     Creatinine 11/16/2020 1.28  0.70 - 1.30 mg/dL Final     GFR MDRD Af Amer 11/16/2020 >60  >60 mL/min/1.73m2 Final     GFR MDRD Non Af Amer 11/16/2020 57* >60 mL/min/1.73m2 Final     Bilirubin, Total 11/16/2020 0.5  0.0 - 1.0 mg/dL Final     Calcium 11/16/2020 9.5  8.5 - 10.5 mg/dL Final     Protein, Total 11/16/2020 6.8  6.0 - 8.0 g/dL Final     Albumin 11/16/2020 4.0  3.5 - 5.0 g/dL Final     Alkaline Phosphatase 11/16/2020 118  45 - 120 U/L Final     AST 11/16/2020 24  0 - 40 U/L Final     ALT 11/16/2020 32  0 - 45 U/L Final     WBC 11/16/2020 8.7  4.0 - 11.0 thou/uL Final     RBC 11/16/2020 4.91  4.40 - 6.20 mill/uL Final     Hemoglobin 11/16/2020 15.0  14.0 - 18.0 g/dL Final     Hematocrit 11/16/2020 44.6  40.0 - 54.0 % Final     MCV 11/16/2020 91  80 - 100 fL Final     MCH 11/16/2020 30.6  27.0 - 34.0 pg Final     MCHC 11/16/2020 33.7  32.0 - 36.0 g/dL Final     RDW 11/16/2020 12.2  11.0 - 14.5 % Final     Platelets 11/16/2020 288  140 - 440 thou/uL Final     MPV 11/16/2020 6.8* 7.0 - 10.0 fL Final     Cholesterol 11/16/2020 165  <=199 mg/dL Final     Triglycerides 11/16/2020 90  <=149 mg/dL Final     HDL Cholesterol 11/16/2020 53  >=40 mg/dL Final     LDL Calculated 11/16/2020 94  <=129 mg/dL Final     Patient Fasting > 8hrs? 11/16/2020 No   Final     TSH 11/16/2020 0.11* 0.30 - 5.00 uIU/mL Final     Free T4 11/16/2020 1.7  0.7 - 1.8 ng/dL Final       --  Review of Systems  Wt Readings from Last 3 Encounters:   11/16/20 (!) 234 lb 1 oz (106.2  "kg)   05/14/19 (!) 230 lb 9.6 oz (104.6 kg)   02/11/19 (!) 226 lb (102.5 kg)     Temp Readings from Last 3 Encounters:   11/16/20 (!) 96.3  F (35.7  C)   02/11/19 98.3  F (36.8  C) (Oral)   01/28/19 98.7  F (37.1  C)     BP Readings from Last 3 Encounters:   11/16/20 130/80   05/14/19 122/62   02/11/19 125/70     Pulse Readings from Last 3 Encounters:   11/16/20 87   05/14/19 80   02/11/19 77      There were no vitals taken for this visit. unable to assess today Pain Score: n/a  Pain Location: n/a     As noted in the subjective section above, otherwise a 10 point review of systems is negative. Limited ability to assess given virtual nature of visit. Review of symptoms based entirely on patient's verbal report and what writer is able to assess via camera if used during appointment.    --  Mental Status Examination    Appearance: appears stated age, clean, well groomed, casually dressed appropriate for weather   Orientation: Patient alert and oriented to person, place, time, and situation  Reliability:  Patient appears to be an adequate historian.   Behavior: Patient makes good eye contact and engages with normal rapport in the interview.   There is no evidence of responding to hallucinations or flashbacks.  Speech: Speech is spontaneous and coherent, with a normal rate, rhythm, and tone.    Language: There are no difficulties with expressive or receptive language as observed throughout the interview.    Mood: Described as \"good\".    Affect: Congruent and shows a normal range and level of reactivity.  Judgement: Able to make basic decision regarding safety.  Insight: Good awareness of physical and mental health conditions and aware of needs around care for these.  Gait and station: unable to assess   Thought process: Logical   Thought content: No evidence of delusions or paranoia.  No thoughts of self-harm or suicide. No thoughts of harming others.  Associations: Connected  Fund of knowledge: Average  Attention / " Concentration: Able to remain focused during the interview with minimal distractibility or need for redirection.  Short Term Memory: Grossly intact as evidence by client recalling themes and ideas discussed.  Long Term Memory: Intact  Motor Status: No recent apparent change.  No current tremor.    --  Diagnostic Impression:  1. Schizophrenia, chronic condition (H)  - T4, free; Future  - TSH; Future  - Lipid panel; Future  - Comprehensive metabolic panel; Future  - CBC and differential; Future  - Hemoglobin A1c; Future    2. Medication monitoring encounter  - T4, free; Future  - TSH; Future  - Lipid panel; Future  - Comprehensive metabolic panel; Future  - CBC and differential; Future  - Hemoglobin A1c; Future    3. Other hyperlipidemia   - T4, free; Future  - TSH; Future  - Lipid panel; Future  - Hemoglobin A1c; Future    4. Other specified disorders of carbohydrate metabolism   - Hemoglobin A1c; Future    --  Medical Decision-Making   Landon Covarrubias is a 65 y.o. male who presents for ongoing outpatient psychiatric care. Information today obtained from patient's verbal report and review of records in EHR. Referred by Samuel Kovacs for evaluation of mental health. Previously followed by provider through [facility] and previously [facility]. 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 Currently residing at [facility]. Has . Past medication trials include, but are not limited to: thorazine and unknown.    Reported stability on current medication combinations. Discussed neuroleptic risks and benefits. No overt or reported signs of adverse medication effects but assessment limited due to poor video quality. Asking patient to present via telehub next month to complete DISCUS. Reviewed recent lab work. Placed orders for indicated lab work. Discussed transition planning with goal of referring to psychiatric provider through [facility] as writer leaving this site in June.     Moderate risk. Patient denies suicidal and homicidal ideation. History of 2 suicide attempts in 1990s. Not at imminent risk this visit. Educated on need to seek emergent services should they become a risk to themselves or others. Landon Covarrubias verbalized understanding and agreement with this safety plan.    --  Plan  1. Continue to monitor for safety  2. Current Medications  1. Continue benztropine 0.5mg two times a day for EPS prevention  2. Continue olanzapine 20mg daily for psychosis and mood stabilization  3. Continue perphenazine 8mg daily for psychosis and mood stabilization  4. Neuroleptic Consent Form Signed: verbal consent obtained 12/22/20  5. REFILLS: see above  1. Labs ordered this visit: n/a   2. Consider individual psychotherapy appointments for mood stabilization and nonpharmacologic coping. Collaborate with interdisciplinary care team as needed.  3. Release of Information:   1. [facility] in [place], [place] verbal consent obtained 12/22/20  2. [facility] verbal consent obtained 12/22/20  4. Consent to  Communicate:  verbal consent obtained 12/22/20  3. Patient will continue abstinence from drugs and alcohol  4. Patient to return to clinic in 1 months for evaluation of medication trials and continued assessment. Ongoing patient psychoeducation regarding chronic illness and treatment.   1. Patient educated that they may schedule sooner appointment or contact writer for any worsening or lack of improvement in symptoms.   5. I reviewed the potential risks, side effects, and benefits of all medications with the patient. Patient verbalized understanding and was encouraged to call clinic with further questions or concerns.    --  START TIME: 9:00 AM  END TIME: 9:30 AM    Appointment duration: 30 minutes with > 75% spent on coordination of care and psycho-education regarding illness, symptoms, neurobiological basis of disease, substance use, alternative interventions, sleep hygiene, safety planning, care planning, and pharmacology.    Nydia Blank, DNP, APRN, PMHNP-BC  Nurse Practitioner - Psychiatry    This medical report was made using Dragon Dictation. Spelling and grammatical errors with Dragon exist and are not intentional.

## 2021-06-16 NOTE — PATIENT INSTRUCTIONS - HE
"1. Have ordered lab work completed before next appointment. We will mail copies of these lab orders to your home so that you may take them to [facility] clinic of your choosing.   2. Continue medications as prescribed  3. Have your pharmacy contact us for a refill if you are running low on medications (We may ask you to come into clinic to get a refill from the nurse)  4. No alcohol or drug use  5. No driving if sedated  6. Contact the clinic with any questions or concerns   a. Phone: [phone]  b. Fax: [phone]  7. Reach out for help if you feel like hurting yourself or others:   a. [facility]: [address] [place] [address], [address] (phone: [phone])  b. [facility] Suicide Hotline: 420.625.8208   c. Crisis Texting Line: Text \"[place]\" to 926970  d. Call 911 or go to nearest Emergency room   8. Follow up as directed in 1 month by telehub in clinic for your appointment    "

## 2021-06-17 NOTE — PATIENT INSTRUCTIONS - HE
Patient Instructions by Taylor Garcia PT at 3/12/2019 11:00 AM     Author: Taylor Garcia PT Service: -- Author Type: Physical Therapist    Filed: 3/12/2019 11:02 AM Encounter Date: 3/12/2019 Status: Addendum    : Taylor Garcia PT (Physical Therapist)    Related Notes: Original Note by Taylor Garcia PT (Physical Therapist) filed at 3/12/2019 11:00 AM        pec stretch    Lay on your back lengthwise on rolled up towels, knees bent up.  Slide your hands on the floor up towards shoulder height. Hold here for 30 seconds. Do 3-4 repetitions. Do 5 days/week    You can do this in your bed

## 2021-06-17 NOTE — PATIENT INSTRUCTIONS - HE
Patient Instructions by Taylor Garcia PT at 3/5/2019 11:00 AM     Author: Taylor Garcia PT Service: -- Author Type: Physical Therapist    Filed: 3/5/2019 11:23 AM Encounter Date: 3/5/2019 Status: Addendum    : Taylor Garcia PT (Physical Therapist)    Related Notes: Original Note by Taylor Garcia PT (Physical Therapist) filed at 3/5/2019 11:22 AM        QUADRUPED ARM/LEG LIFT    Start on hands and knees while keeping your spine flat and neutral.  Tighten abdominal muscles.  Raise leg back and opposite arm and hold 3-5 seconds.  Return to original position and alternate. Do 10-15 reps on each side. Perform 1x/day      SHOULDER EXTENSION/PULL DOWNS with band    While holding an elastic band with both arms in front of you with your elbows straight, pull the band downwards and back towards your side. Add 5 second holds, x15 times perform 1x/day

## 2021-06-17 NOTE — PROGRESS NOTES
________________________________________  Medications Phoned  to Pharmacy [] yes [x]no  Name of Pharmacist:  List Medications, including dose, quantity and instructions    Medications ordered this visit were e-scribed.  Verified by order class [x] yes  [] no  Cogentin 1 mg; perphenazine 8 mg; Zyprexa 20 mg  Medication changes or discontinuations were communicated to patient's pharmacy: [] yes  [x] no    Dictation completed at time of chart check: [] yes  [x] no    I have checked the documentation for today s encounters and the above information has been reviewed and completed.

## 2021-06-17 NOTE — PROGRESS NOTES
This video/telephone visit will be conducted via a call between you and your physician/provider. We have found that certain health care needs can be provided without the need for an in-person physical exam. This service lets us provide the care you need with a video /telephone conversation. If a prescription is necessary we can send it directly to your pharmacy. If lab work is needed we can place an order for that and you can then stop by a lab to have the test done at a later time.    Just as we bill insurance for in-person visits, we also bill insurance for video/telephone visits. If you have questions about your insurance coverage, we recommend that you speak with your insurance company.    Patient has given verbal consent for video  visit? Yes   Patient would like the video visit invitation sent by: SIP CALL to: [email or web address]  AMANDA/KOKO LERNER CMA   Medication refill is pended for review and approval by provider.  Discus performed today Score: 0 due 11/2021   Patient verified allergies, medications and pharmacy via phone. PHQ: 1 and PENNY: 1 done verbally with writer. Patient states he is ready for visit.  [place]  to be reviewed by provider.

## 2021-06-17 NOTE — PROGRESS NOTES
"Telemedicine Visit: The patient's condition can be safely assessed and treated via synchronous audio and visual telemedicine encounter.      Reason for Telemedicine Visit: Patient unable to travel    Originating Site (Patient Location): [facility]: [facility]    Distant Site (Provider Location): Provider Remote Setting- Home Office    Consent:  The patient/guardian has verbally consented to: the potential risks and benefits of telemedicine (video visit) versus in person care; bill my insurance or make self-payment for services provided; and responsibility for payment of non-covered services.     Mode of Communication:  Video Conference via [employer]    As the provider I attest to compliance with applicable laws and regulations related to telemedicine.  Outpatient Psychiatric Follow Up    Date of Service: 5/14/2021    --  Chief Complaint: \"things are going good\"     --  History of Present Illness/Client Impression of Mental Health Consult:    Landon Covarrubias is a 65 y.o. male who presents for follow up appointment. Last visit occurred 4/16/20121.     Enjoying his job at [employer] still. Observed oral dyskinesia. Feeling at baseline with symptoms.     States mood is \"good\". Rates depression and anxiety manageable. No sleep or energy maintenance concerns. No appetite or weight concerns. No suicidal and homicidal ideation. No overt psychosis. Denies all other psychiatric symptoms. No new physical concerns.     Medication adherence: Reviewed risk/benefits of medication , Patient able to verbalize understanding of side effects  and Patient verbally consents to taking medications  Medication side effects: none  The patient was given information on medications: currently prescribed    --  [place] Prescription Monitoring Program  No worrisome pharmacy activity.     --  Clinical Outcomes Measures:  PHQ-9 Total Score: 1  PENNY-7: 1  DISCUS: 0       --  Current Medications:  Current " Outpatient Medications   Medication Sig Dispense Refill     benztropine (COGENTIN) 0.5 MG tablet TAKE 1 TABLET (0.5 MG TOTAL) BY MOUTH 2 (TWO) TIMES A DAY. 60 tablet 2     famotidine (PEPCID) 20 MG tablet 1 TAB BY MOUTH EVERY BEDTIME 30 tablet 10     folic acid (FOLVITE) 400 MCG tablet 1 TAB BY MOUTH DAILY (DX: DEFICIENCY) 100 tablet 2     glucosamine sulfate 500 mg Tab Take 1,500 mg by mouth.       levothyroxine (SYNTHROID, LEVOTHROID) 200 MCG tablet 1 TAB BY MOUTH DAILY ON AN EMPTY STOMACH (DX: HYPOTHYROIDISM) 30 tablet 10     lisinopriL (PRINIVIL,ZESTRIL) 5 MG tablet 1 TAB BY MOUTH DAILY (DX: HYPERTENSION) 30 tablet 10     metoprolol succinate (TOPROL-XL) 25 MG 1 TAB BY MOUTH DAILY (DX: HYPERTENSION) 30 tablet 10     niacin 500 MG tablet Take 500 mg by mouth daily with breakfast.       OLANZapine (ZYPREXA) 20 MG tablet TAKE 1 TABLET (20 MG TOTAL) BY MOUTH AT BEDTIME. 30 tablet 2     omeprazole (PRILOSEC) 20 MG capsule Take 20 mg by mouth.       perphenazine 8 MG tablet TAKE 1 TABLET (8 MG TOTAL) BY MOUTH 2 (TWO) TIMES A DAY. 60 tablet 2     polyethylene glycol (GLYCOLAX) 17 gram/dose powder Take 17 g by mouth daily. 850 g 3     SENEXON-S 8.6-50 mg tablet 1 TAB BY MOUTH TWICE DAILY (DX: CONSTIPATION) 100 tablet 11     simvastatin (ZOCOR) 20 MG tablet 1 TAB BY MOUTH EVERY EVENING (DX: HYPERLIPIDEMIA) 30 tablet 10     tamsulosin (FLOMAX) 0.4 mg cap 1 CAP BY MOUTH DAILY (DX: URINARY RETENTION) 30 capsule 11     No current facility-administered medications for this visit.        --  Allergies  No Known Allergies  --  Summary of Diagnostic Studies  No visits with results within 30 Day(s) from this visit.   Latest known visit with results is:   Office Visit on 11/16/2020   Component Date Value Ref Range Status     Sodium 11/16/2020 131* 136 - 145 mmol/L Final     Potassium 11/16/2020 5.2* 3.5 - 5.0 mmol/L Final     Chloride 11/16/2020 99  98 - 107 mmol/L Final     CO2 11/16/2020 25  22 - 31 mmol/L Final     Anion Gap,  Calculation 11/16/2020 7  5 - 18 mmol/L Final     Glucose 11/16/2020 102  70 - 125 mg/dL Final     BUN 11/16/2020 12  8 - 22 mg/dL Final     Creatinine 11/16/2020 1.28  0.70 - 1.30 mg/dL Final     GFR MDRD Af Amer 11/16/2020 >60  >60 mL/min/1.73m2 Final     GFR MDRD Non Af Amer 11/16/2020 57* >60 mL/min/1.73m2 Final     Bilirubin, Total 11/16/2020 0.5  0.0 - 1.0 mg/dL Final     Calcium 11/16/2020 9.5  8.5 - 10.5 mg/dL Final     Protein, Total 11/16/2020 6.8  6.0 - 8.0 g/dL Final     Albumin 11/16/2020 4.0  3.5 - 5.0 g/dL Final     Alkaline Phosphatase 11/16/2020 118  45 - 120 U/L Final     AST 11/16/2020 24  0 - 40 U/L Final     ALT 11/16/2020 32  0 - 45 U/L Final     WBC 11/16/2020 8.7  4.0 - 11.0 thou/uL Final     RBC 11/16/2020 4.91  4.40 - 6.20 mill/uL Final     Hemoglobin 11/16/2020 15.0  14.0 - 18.0 g/dL Final     Hematocrit 11/16/2020 44.6  40.0 - 54.0 % Final     MCV 11/16/2020 91  80 - 100 fL Final     MCH 11/16/2020 30.6  27.0 - 34.0 pg Final     MCHC 11/16/2020 33.7  32.0 - 36.0 g/dL Final     RDW 11/16/2020 12.2  11.0 - 14.5 % Final     Platelets 11/16/2020 288  140 - 440 thou/uL Final     MPV 11/16/2020 6.8* 7.0 - 10.0 fL Final     Cholesterol 11/16/2020 165  <=199 mg/dL Final     Triglycerides 11/16/2020 90  <=149 mg/dL Final     HDL Cholesterol 11/16/2020 53  >=40 mg/dL Final     LDL Calculated 11/16/2020 94  <=129 mg/dL Final     Patient Fasting > 8hrs? 11/16/2020 No   Final     TSH 11/16/2020 0.11* 0.30 - 5.00 uIU/mL Final     Free T4 11/16/2020 1.7  0.7 - 1.8 ng/dL Final       --  Review of Systems  Wt Readings from Last 3 Encounters:   05/14/21 (!) 224 lb (101.6 kg)   11/16/20 (!) 234 lb 1 oz (106.2 kg)   05/14/19 (!) 230 lb 9.6 oz (104.6 kg)     Temp Readings from Last 3 Encounters:   11/16/20 (!) 96.3  F (35.7  C)   02/11/19 98.3  F (36.8  C) (Oral)   01/28/19 98.7  F (37.1  C)     BP Readings from Last 3 Encounters:   05/14/21 133/79   11/16/20 130/80   05/14/19 122/62     Pulse Readings from  "Last 3 Encounters:   05/14/21 87   11/16/20 87   05/14/19 80      /79 (Patient Site: Right Arm, Patient Position: Sitting, Cuff Size: Adult Regular)   Pulse 87   Ht 5' 9\" (1.753 m)   Wt (!) 224 lb (101.6 kg)   BMI 33.08 kg/m   unable to assess today Pain Score: n/a  Pain Location: n/a     As noted in the subjective section above, otherwise a 10 point review of systems is negative. Limited ability to assess given virtual nature of visit. Review of symptoms based entirely on patient's verbal report and what writer is able to assess via camera if used during appointment.    --  Mental Status Examination    Appearance: appears stated age, clean, well groomed, casually dressed appropriate for weather   Orientation: Patient alert and oriented to person, place, time, and situation  Reliability:  Patient appears to be an adequate historian.   Behavior: Patient makes good eye contact and engages with normal rapport in the interview.   There is no evidence of responding to hallucinations or flashbacks.  Speech: Speech is spontaneous and coherent, with a normal rate, rhythm, and tone.    Language: There are no difficulties with expressive or receptive language as observed throughout the interview.    Mood: Described as \"good\".    Affect: Congruent and shows a normal range and level of reactivity.  Judgement: Able to make basic decision regarding safety.  Insight: Good awareness of physical and mental health conditions and aware of needs around care for these.  Gait and station: unable to assess   Thought process: Logical   Thought content: No evidence of delusions or paranoia.  No thoughts of self-harm or suicide. No thoughts of harming others.  Associations: Connected  Fund of knowledge: Average  Attention / Concentration: Able to remain focused during the interview with minimal distractibility or need for redirection.  Short Term Memory: Grossly intact as evidence by client recalling themes and ideas " discussed.  Long Term Memory: Intact  Motor Status: No recent apparent change. Oral dyskinesia.  No current tremor.    --  Diagnostic Impression:  1. Schizophrenia, chronic condition (H)  - benztropine (COGENTIN) 1 MG tablet; Take 1 tablet (1 mg total) by mouth 2 (two) times a day.  Dispense: 60 tablet; Refill: 2  - OLANZapine (ZYPREXA) 20 MG tablet; Take 1 tablet (20 mg total) by mouth at bedtime.  Dispense: 30 tablet; Refill: 2  - perphenazine 8 MG tablet; Take 1 tablet (8 mg total) by mouth 2 (two) times a day.  Dispense: 60 tablet; Refill: 2  - AMB REFERRAL TO MENTAL HEALTH AND ADDICTION  - Adult (18+); Psychiatry, ECT and Medication Management; Psychiatry; [facility] [phone]; We will contact you to schedule the appointment or please call with any ques...\    --  Medical Decision-Making   Landon Covarrubias is a 65 y.o. male who presents for ongoing outpatient psychiatric care. Information today obtained from patient's verbal report and review of records in EHR. Referred by Samuel Kovacs for evaluation of mental health. Previously followed by provider through [facility] and previously [facility]. 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 Currently residing at [facility]. Has . Past medication trials include, but are not limited to: thorazine and unknown.    Reported stability on current medication combinations. Discussed neuroleptic risks and benefits. No reported signs of adverse medication effects but observable oral dyskinesia so will titrate Cogentin. Asking patient to present via telehub next month to complete DISCUS. Reviewed recent lab work. Placed orders for indicated lab work which are still pending. Discussed transition planning with goal of referring to psychiatric provider through [facility] as writer leaving this site in June.     Moderate risk. Patient denies suicidal and homicidal ideation. History of 2 suicide attempts in 1990s. Not at imminent risk this visit. Educated on need to seek emergent services should they become a risk to themselves or others. Landon Covarrubias verbalized understanding and agreement with this safety plan.    --  Plan  1. Continue to monitor for safety  2. Current Medications  1. TITRATE benztropine 0.5mg to 1mg two times a day for EPS prevention  2. Continue olanzapine 20mg daily for psychosis and mood stabilization  3. Continue perphenazine 8mg daily for psychosis and mood stabilization  4. Neuroleptic Consent Form Signed: verbal consent obtained 12/22/20  5. REFILLS: see above  1. Labs ordered this visit: see above  2. Consider individual psychotherapy appointments for mood stabilization and nonpharmacologic coping. Collaborate with interdisciplinary care team as needed.  3. Release of Information:   1. [facility] in [place], [place] verbal consent obtained 12/22/20  2. [facility] verbal consent obtained 12/22/20  4. Consent to Communicate:  verbal consent obtained 12/22/20  3. Patient will continue  abstinence from drugs and alcohol  4. Patient to return to clinic in 1 months for evaluation of medication trials and continued assessment. Ongoing patient psychoeducation regarding chronic illness and treatment.   1. Patient educated that they may schedule sooner appointment or contact writer for any worsening or lack of improvement in symptoms.   5. I reviewed the potential risks, side effects, and benefits of all medications with the patient. Patient verbalized understanding and was encouraged to call clinic with further questions or concerns.    --  START TIME: 11:00 AM  END TIME: 11:30 AM    Appointment duration: 30 minutes with > 75% spent on coordination of care and psycho-education regarding illness, symptoms, neurobiological basis of disease, substance use, alternative interventions, sleep hygiene, safety planning, care planning, and pharmacology.    Nydia Blank, DNP, APRN, PMHNP-BC  Nurse Practitioner - Psychiatry    This medical report was made using Dragon Dictation. Spelling and grammatical errors with Dragon exist and are not intentional.

## 2021-06-17 NOTE — PATIENT INSTRUCTIONS - HE
"1. Scheduled appointment with primary care to review abnormal lab work  2. START taking Cogentin 1mg two times a day for oral dyskinesia   3. Continue other medications as prescribed  4. Have your pharmacy contact us for a refill if you are running low on medications (We may ask you to come into clinic to get a refill from the nurse)  5. No alcohol or drug use  6. No driving if sedated  7. Contact the clinic with any questions or concerns   a. Phone: [phone]  b. Fax: [phone]  8. Reach out for help if you feel like hurting yourself or others:   a. [facility]: [address] [place] [address], [address] (phone: [phone])  b. [facility] Suicide Hotline: [phone]   c. Crisis Texting Line: Text \"[place]\" to 821462  d. Call 911 or go to nearest Emergency room   9. Follow up as directed next month in clinic with provider for your appointment    "

## 2021-06-17 NOTE — PATIENT INSTRUCTIONS - HE
Patient Instructions by Taylor Garcia PT at 2/19/2019 11:00 AM     Author: Taylor Garcia PT Service: -- Author Type: Physical Therapist    Filed: 2/19/2019 11:26 AM Encounter Date: 2/19/2019 Status: Addendum    : Taylor Garcia PT (Physical Therapist)    Related Notes: Original Note by Taylor Garcia PT (Physical Therapist) filed at 2/19/2019 11:23 AM       It is recommended that you alternate ice and heat on your back when you have pain. You can put it on for 20 minutes at a time. Wait at least 30 minutes before alternating. Remember to not apply ice directly on skin.    Posture, posture, posture!!      SCAPULAR RETRACTIONS    Draw your shoulder blades back and down. Hold for 5 seconds. Do 10 reps at a time. Perform throughout the day for good posture.    ELASTIC BAND ROWS     Holding elastic band with both hands, draw back the band as you bend your elbows. Keep your elbows near the side of your body. Use green band. Hold for 5 seconds. Do until your muscles are tired. Perform 1x/day      DOORWAY STRETCH - LOW    While standing in a doorway, place your arm downward on the door frame and lean in until a stretch is felt along the front of your chest and/or shoulder. Your arm should be pointed downward towards the floor along the door frame. Hold for 30 seconds. Do 2-3 reps at a time. Perform 1-2x/day    NOTE: Your legs should control how much you stretch by bending or straightening your knee through the doorway.      STRAIGHT LEG RAISE - SLR    While lying or sitting, raise up your leg with a straight knee.  Keep the opposite knee bent with the foot planted to the ground. Hold for 5 seconds. Do until fatigue on each leg. Perform 1x/day

## 2021-06-21 NOTE — PROGRESS NOTES
ASSESSMENT: Landon Covarrubias is a 63 y.o. male with past medical history significant for hypothyroidism, hypercholesterolemia, schizophrenia, hypertension, penile ulcer who presents today for new patient evaluation of acute bilateral mid and lower back pain.  Pain is most consistent with lumbar strain.  Patient does have a history of an L4-5 transforaminal lumbar interbody fusio on June 13, 2016 with Dr. Craig.  Patient does not have any radicular symptoms.  He is neurologically intact.    ORVILLE: 24  Who 5: 13    PLAN:  A shared decision making model was used.  The patient's values and choices were respected.  The following represents what was discussed and decided upon by the physician assistant and the patient.      1.  DIAGNOSTIC TESTS: I ordered an x-ray lumbar spine given his history of lumbar fusion.  If pain fails to improve, may need to obtain an MRI.    2.  PHYSICAL THERAPY:  Discussed the importance of core strengthening, ROM, stretching exercises with the patient and how each of these entities is important in decreasing pain.  Explained to the patient that the purpose of physical therapy is to teach the patient a home exercise program.  These exercises need to be performed every day in order to decrease pain and prevent future occurrences of pain.  Entered a referral for the patient begin physical therapy at the [facility].    3.  MEDICATIONS: No changes are made to the patient's medications.  He will continue using Aleve as needed.    4.  INTERVENTIONS: No interventions were ordered.  Patient could potentially benefit from interventional pain management if she fails to improve with conservative treatment, depending on the results of an MRI.  However, I think this will likely resolve without intervention.    5.  PATIENT EDUCATION: Patient is in agreement the above plan.  All questions were answered.    6.  FOLLOW-UP:   I will see the patient back in clinic in 2 weeks to follow-up.   If he has any questions or concerns in the meantime, he should not hesitate contact our clinic.      SUBJECTIVE:  Landon Covarrubias  Is a 63 y.o. male who presents today in consultation at the request of Dr. Kovacs for new patient evaluation of acute mid and lower back pain.  Patient has a previous history of lumbar spine surgery x2.  He underwent an L4-5 laminectomy on June 8, 2015.  He subsequently had an L4-5 transforaminal lumbar interbody fusion on June 13, 2016.  Patient states that he did well after his second back surgery.  He did not have any problems with his back until 3 months ago.  Patient states that in August he took a long walking he developed back pain which limited his walking.  He states that he cannot walk more than a yards before he would have to stop and rest.  This pain lasted 3 days but resolved.  He was doing fine until 5 days ago when pain flared up.  Patient states that he works at [employer].  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  He states that he could not stand up straight.  He had to leave work early.  Patient states that he has continued to have significant pain since Saturday.  He was seen at his primary care clinic on November 13, 2018.  He was referred to our clinic for further evaluation and treatment.    Patient complains of bilateral back pain.  Patient states that he has an area of pain in the lower thoracic region and an area of pain spanning across the low back in a broadband distribution at the belt line.  Both sides of the back are equally painful.  He describes the pain as a pressure sensation.  He denies any pain radiating into the buttocks or down the legs.  Pain is aggravated with bending and twisting.  He denies any alleviating factors for the pain.  She denies any numbness, tingling, or weakness down the legs.  Denies any loss of bowel or bladder control.  Denies any recent  fevers, chills, sweats.    The patient to physical therapy for his lower back most recently in 2015 and 2016.  It was helpful.  He does not go to chiropractor.  He has not had any spine injections.  Patient is using Aleve 2 tabs twice daily.    Patient's primary care provider wrote a work note excusing him from work through Saturday, November 17, 2018.  Patient works part-time at [employer].  He is hopeful that he is going to be switching to a different location where he will work from 10-2.  If he got that job he would not have to do the mopping or setting up chairs.    Current Outpatient Medications on File Prior to Encounter   Medication Sig Dispense Refill     folic acid (FOLVITE) 400 MCG tablet TAKE 1 TAB BY MOUTH ONCE DAILY FOR SUPPLEMENT  11     glucosamine sulfate 500 mg Tab Take 1,500 mg by mouth.       haloperidol (HALDOL) 5 MG tablet Take 2.5 mg by mouth.       levothyroxine (SYNTHROID, LEVOTHROID) 200 MCG tablet TAKE 1 TAB BY MOUTH ONCE DAILY  11     lisinopril (PRINIVIL,ZESTRIL) 5 MG tablet Take 5 mg by mouth daily.  11     metoprolol succinate (TOPROL-XL) 25 MG Take 25 mg by mouth.       niacin 500 MG tablet Take 500 mg by mouth daily with breakfast.       OLANZapine (ZYPREXA) 20 MG tablet Take 20 mg by mouth bedtime.        omeprazole (PRILOSEC) 20 MG capsule Take 20 mg by mouth.       perphenazine 8 MG tablet Take 24 mg by mouth 2 (two) times a day.       simvastatin (ZOCOR) 20 MG tablet Take 20 mg by mouth at bedtime.       tamsulosin (FLOMAX) 0.4 mg cap Take 0.4 mg by mouth daily.       QUEtiapine (SEROQUEL) 50 MG tablet Take 50 mg by mouth 3 (three) times a day as needed.       SENEXON-S 8.6-50 mg tablet          No Known Allergies    Past Medical History:   Diagnosis Date     Acute Renal Insufficiency     Created by Conversion      Acute Renal Insufficiency     Created by Conversion  Replacement Utility updated for latest IMO load     Altered mental state 6/23/2016     Cellulitis of corpus  cavernosum and penis 2018     Chronic kidney disease      Delirium 6/23/2016     Dementia praecox (H) 5/16/2017     Disease of thyroid gland      Elevated blood pressure      Encephalopathy acute      Essential hypertension      Hematuria 6/24/2016     Hypercholesterolemia      Hyperlipidemia     Created by Conversion      Hypokalemia 7/5/2016     Hypothyroidism     Created by Conversion      Hypothyroidism     Created by Conversion  Replacement Utility updated for latest IMO load     Impaired fasting glucose     Created by Conversion      Lateral epicondylitis     Created by Conversion  Replacement Utility updated for latest IMO load     Lumbar spinal stenosis 6/8/2015     Mood disorder due to a general medical condition      Other cognitive disorder due to general medical condition      Other specified fever      Penile swelling      Retention of urine 7/16/2016     Schizophrenia (H)     Created by Conversion  Replacement Utility updated for latest IMO load     Schizophrenia, chronic condition (H)      Spondylolisthesis of lumbar region 6/13/2016     Systemic infection (H) 7/5/2016     Undifferentiated schizophrenia (H)      Urinary tract infection 7/5/2016      Patient Active Problem List   Diagnosis     Impaired Fasting Glucose     Hypothyroidism     Hypercholesterolemia     Undifferentiated schizophrenia (H)     Lumbar spinal stenosis     Spondylolisthesis of lumbar region     Elevated blood pressure     Other specified fever     Altered mental state     Mood disorder due to a general medical condition     Other cognitive disorder due to general medical condition     Schizophrenia, chronic condition (H)     Essential hypertension     Hematuria     Hypertension     Retention of urine     Systemic infection (H)     Dementia praecox (H)     Hypokalemia     Chronic ulcer of corpus cavernosum or penis         Past Surgical History:   Procedure Laterality Date     BACK SURGERY       LUMBAR LAMINECTOMY Bilateral  6/8/2015    Procedure: BILATERAL LAMINECTOMY WITH FACET CYST REMOVAL L4-5;  Surgeon: Bob Craig MD;  Location: [facility];  Service:      TONSILLECTOMY       WISDOM TOOTH EXTRACTION         Family History   Problem Relation Age of Onset     Parkinsonism Mother      Kidney disease Father      No Medical Problems Sister      Anesthesia problems Neg Hx        Social history: Patient is single.  He lives in an assisted living facility.  He works part-time at [employer].  He denies tobacco alcohol, or illicit drug use.    ROS: Positive for constipation, diarrhea, back pain, joint pain, leg pain.  Specifically negative for bowel/bladder dysfunction, fevers,chills, appetite changes, unexplained weight loss.   Otherwise 13 systems reviewed are negative.  Please see the patient's intake questionnaire from today for details.      OBJECTIVE:  PHYSICAL EXAMINATION:    CONSTITUTIONAL:  Vital signs as above.  No acute distress.  The patient is well nourished and well groomed.  PSYCHIATRIC:  The patient is awake, alert, oriented to person, place, time and answering questions appropriately with clear speech.  Flat affect.  HEENT: Normocephalic, atraumatic.  Sclera clear.  Neck is supple.  SKIN:  Skin over the face, bilateral lower extremities, and posterior torso is clean, dry, intact without rashes.    GAIT:  Gait is non-antalgic.  The patient is able to heel and toe walk without significant difficulty.    STANDING EXAMINATION: Mild tenderness palpation over the paraspinous muscles at the thoracolumbar junction.  Lumbar flexion mildly restricted.  Lumbar extension mildly restricted.  Lumbar facet loading maneuvers increased low back pain on the right.  MUSCLE STRENGTH:  The patient has 5/5 strength for the bilateral hip flexors, knee flexors/extensors, ankle dorsiflexors/plantar flexors, great toe extensors, ankle evertors/invertors.  NEUROLOGICAL: 1+ patellar, and a trace Achilles reflexes bilaterally.  Negative  Babinski's bilaterally.  No ankle clonus bilaterally. Sensation to light touch is intact in the bilateral L4, L5, and S1 dermatomes.  VASCULAR:  2/4 dorsalis pedis pulses bilaterally.  Bilateral lower extremities are warm.  There is no pitting edema of the bilateral lower extremities.  ABDOMINAL:  Soft, non-distended, non-tender throughout all quadrants.  No pulsatile mass palpated in the left lower quadrant.  LYMPH NODES:  No palpable or tender inguinal lymph nodes.  MUSCULOSKELETAL: Straight leg raise reproduces low back pain on the right.  Negative straight leg raise left.    RESULTS:  XR LUMBAR SPINE 1 VIEW  6/23/2016 3:08 AM     INDICATION: pre-MRI  COMPARISON: None.     FINDINGS: No definite metallic foreign body within spinal canal prevertebral soft tissues. Postoperative changes transpedicular screw and angel fixation L4-L5 with intervertebral spacer device in place; 5 lumbar vertebrae vertebral bodies assumed.

## 2021-06-21 NOTE — PROGRESS NOTES
[facility] Certification Request    November 16, 2018      Patient: Landon Covarrubias  MR Number: [id number]  YOB: 1955  Date of Visit: 11/16/2018      Dear Sol Nieto,    Thank you for this referral.   We are seeing Landon Covarrubias for Physical Therapy of lumbar strain.    Medicare and/or Medicaid requires physician review and approval of the treatment plan. Please review the plan of care and verify that you agree with the therapy plan of care by co-signing this note.      Plan of Care  Authorization / Certification Start Date: 11/16/18  Authorization / Certification End Date: 02/16/19  Authorization / Certification Number of Visits: 12  Communication with: Referral Source  Patient Related Instruction: Nature of Condition;Treatment plan and rationale;Self Care instruction;Basis of treatment;Body mechanics;Posture;Precautions;Next steps  Times per Week: 1  Number of Weeks: 8  Number of Visits: 8  Discharge Planning: when PT goals are met  Precautions / Restrictions : L4-5 fusion  Therapeutic Exercise: ROM;Stretching;Strengthening  Neuromuscular Reeducation: kinesio tape;core  Manual Therapy: soft tissue mobilization;myofascial release;joint mobilization;muscle energy  Equipment: theraband      Goals:  Pt. will demonstrate/verbalize independence in self-management of condition in : 12 weeks  Pt. will be independent with home exercise program in : 6 weeks    Patient will decrease : ORVILLE score;by _ points;for improved quality of function;for improved quality of life;in 6 weeks  by ___ points: 6        If you have any questions or concerns, please don't hesitate to call.    Sincerely,      Taylor Garcia, PT, DPT        Physician recommendation:     ___ Follow therapist's recommendation        ___ Modify therapy      *Physician co-signature indicates they certify the need for these services furnished within this plan and while under their care.        [facility]   [facility]  Initial Evaluation    Patient Name: Landon Covarrubias  Date of evaluation: 11/16/2018  Referral Diagnosis: low back pain  Referring provider: Sol Nieto PA-C  Visit Diagnosis:     ICD-10-CM    1. Lumbar spine pain M54.5    2. Strain of lumbar region, initial encounter S39.012A    3. History of lumbar fusion Z98.1        Assessment:      Landon Covarrubias is a 63 y.o. male who presents to therapy today with chief complaints of bilateral low back pain. Symptoms began in July 2018 while walking too far and needing to take a break due to his back pain. The his pain was recently flared up while mopping and lifting chairs at work last week.  Patient has a PMH that is positive for hypothyroidism, hypercholesterolemia, schizophrenia, hypertension, penile ulcer, and history of L4-5 fusion in 2016. Difficulty with twisting, bending, standing 30 minutes, sitting 4 hours, walking 15 minutes due to pain.  Pain symptoms are not improving. Patient had negative neural tension testing today but increased pain with lumbar AROM. He also had tight gluts, piriformis, and hamstring muscles upon examination. PT POC and goals have been discussed with patient and He  is agreeable to these. Patient appears motivated for physical therapy and is appropriate for skilled therapy services.    The POC is dynamic and will be modified on an ongoing basis.  Barriers to achieving goals as noted in the assessment section may affect outcome.  Prognosis to achieve goals is  good   Pt. is appropriate for skilled PT intervention as outlined in the Plan of Care (POC).  Pt. is a good candidate for skilled PT services to improve pain levels and function.    Goals:  Pt. will demonstrate/verbalize independence in self-management of condition in : 12 weeks  Pt. will be independent with home exercise program in : 6 weeks    Patient will decrease : ORVILLE score;by _ points;for improved quality of function;for improved quality of life;in 6 weeks  by ___ points:  "6      Patient's expectations/goals are realistic.    Barriers to Learning or Achieving Goals:  Chronicity of the problem.  Co-morbidities or other medical factors.  see PMH       Plan / Patient Instructions:        Plan of Care:   Authorization / Certification Start Date: 18  Authorization / Certification End Date: 19  Authorization / Certification Number of Visits: 12  Communication with: Referral Source  Patient Related Instruction: Nature of Condition;Treatment plan and rationale;Self Care instruction;Basis of treatment;Body mechanics;Posture;Precautions;Next steps  Times per Week: 1  Number of Weeks: 8  Number of Visits: 8  Discharge Planning: when PT goals are met  Precautions / Restrictions : L4-5 fusion  Therapeutic Exercise: ROM;Stretching;Strengthening  Neuromuscular Reeducation: kinesio tape;core  Manual Therapy: soft tissue mobilization;myofascial release;joint mobilization;muscle energy  Equipment: theraband      Exercises:  Exercise #1: supine glut and piriformis stretch  Comment #1: seated HS stretch      Plan for next visit: review HEP, cat/cow, abd set, glut set     Subjective:         Social information:   Occupation:[employer]   Work Status:Working part time      History of Present Illness:    Landon Covarrubias is a 63 y.o. male who presents to therapy today with complaints of acute on chronic low back pain. Date of onset/duration of symptoms is 2018 when he took a long walk and had to talk a break. Then he had another flare up at work last weekend lifting chairs and mopping the floors. Symptoms are intermittent and not improving. Pain is worse than when he had his surgeries. Tried a yoga DVD for pain relief but it didn't really help.    PMH: low back surgeries x2. L4-5 laminectomy in 2015 and L4-6 fusion 2016    Pain Ratin  Pain rating at best: 5  Pain rating at worst: 8  Pain description: \"like someone is putting pressure on my nerves\"    Functional limitations are " described as occurring with: twisting, bending, standing 30 minutes, sitting 4 hours, walking 15 minutes        Objective:      Note: Items left blank indicates the item was not performed or not indicated at the time of the evaluation.    Patient Outcome Measures :    Modified Oswestry Low Back Pain Disablity Questionnaire  in %: 32     Scores range from 0-100%, where a score of 0% represents minimal pain and maximal function. The minimal clinically important difference is a score reduction of 12%.    Examination  1. Lumbar spine pain     2. Strain of lumbar region, initial encounter     3. History of lumbar fusion         Involved side: Bilateral  Posture Observation: severely protracted shoulders and mod protracted head.    Gait: non-antalgic. No AD. Min arm swing B    Lumbar ROM:    Date:      *Indicate scale AROM AROM AROM   Lumbar Flexion Mid tibia, mildly increases pain     Lumbar Extension Severely increased pain around T12-L1      Right Left Right Left Right Left   Lumbar Sidebending Increase in pain on R side of back, min limited No increase in pain, WFL       Lumbar Rotation Increase in pain across pain, WFL Increase in pain across back, WFL       Thoracic Flexion      Thoracic Extension      Thoracic Sidebending         Thoracic Rotation           Lower Extremity Strength:   4+/5 B, pain-free    Sensation    Intact per subjective    Palpation: no tenderness with palpation    Lumbar Special Tests:     Lumbar Special Tests Right Left SI Tests Right  Left   Quadrant test   SI Compression     Straight leg raise 70- limited by HS tightness 65- limited by HS tightness SI Distraction     Crossover response   POSH Test neg neg   Slump neg neg Sacral Thrust     Sit-up test  MAMIE neg neg   Trunk extensor endurance test  Resisted Abduction neg neg   Prone instability test  Other:     Pubic shotgun  Other:       LE Screen:  B hip PROM: WFL, pain-free    Treatment Today     TREATMENT MINUTES COMMENTS   Evaluation 15  Low back   Self-care/ Home management     Manual therapy     Neuromuscular Re-education     Therapeutic Activity     Therapeutic Exercises 15 Discussed PT POC and pathology of condition. Answered patient questions. Began HEP-see flowsheet. Recommend patient ice daily. See AVS for details.   Gait training     Modality__________________                Total 30    Blank areas are intentional and mean the treatment did not include these items.            PT Evaluation Code: (Please list factors)  Patient History/Comorbidities: see PMH  Examination: bilateral low back pain  Clinical Presentation: stable  Clinical Decision Making: low    Patient History/  Comorbidities Examination  (body structures and functions, activity limitations, and/or participation restrictions) Clinical Presentation Clinical Decision Making (Complexity)   No documented Comorbidities or personal factors 1-2 Elements Stable and/or uncomplicated Low   1-2 documented comorbidities or personal factor 3 Elements Evolving clinical presentation with changing characteristics Moderate   3-4 documented comorbidities or personal factors 4 or more Unstable and unpredictable High              Taylor Garcia, PT, DPT  11/16/2018  9:24 AM

## 2021-06-21 NOTE — PROGRESS NOTES
Patient ID: Landon Covarrubias is a 63 y.o. male.  /68   Pulse 88   Wt (!) 228 lb (103.4 kg)   SpO2 97%   BMI 33.67 kg/m      Assessment/Plan:                   Diagnoses and all orders for this visit:    Acute bilateral low back pain without sciatica    Screen for colon cancer  -     Ambulatory referral for Colonoscopy      DISCUSSION  We will refer to the spine clinic for further evaluation and treatment.  I feel his symptoms are not indicative of any significant process causing neurologic impingement.  I think he is expensing a low back strain.  I believe ultimately he would benefit most from physical therapy.  Discussed with patient that it would be ideal to place a work restriction and not have him do the activities he is currently doing.  Patient declines any restriction but states he will call his workplace and plan to take a brief hiatus from his current job until he can improve his back symptoms.  Subjective:     HPI    Landon Covarrubias is a 63 y.o. male     He is here today concerned regarding low back pain.  He has not had any significant ongoing problem since his back surgery in 2016 for lumbar stenosis.  He does report in August he went for a long walk and developed some bilateral low back pain without any radiation of symptoms into his legs.  He rested and the symptoms resolved.  About 3 weeks ago he started working at [employer].  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  He states that since doing this work he gets ongoing more persistent pain.  He states he is a tolerable level of pain when he is not at work or moving but when he is at work his pain level builds up to a significant level where he cannot perform adequately.  He denies any development of radiation of pain into his legs he denies any weakness numbness or tingling.  He otherwise feels well.  Denies fevers chills night sweats unexplained weight loss or change in appetite.  Urinary  symptoms such as dysuria or frequency.  It is noted that he has been working with the wound clinic for a wound on the penis itself which had caused great difficulties, this has been improving and is not reported as a current concern.    Review of Systems  Complete review of systems is obtained.  Other than the specific considerations noted above complete review of systems is negative.      Objective:   Medications:  Current Outpatient Medications   Medication Sig Note     folic acid (FOLVITE) 400 MCG tablet TAKE 1 TAB BY MOUTH ONCE DAILY FOR SUPPLEMENT 5/16/2017: Received from: External Pharmacy     glucosamine sulfate 500 mg Tab Take 1,500 mg by mouth. 5/16/2017: Received from: [facility] Received Sig: Take 1,500 mg by mouth once daily.     haloperidol (HALDOL) 5 MG tablet Take 2.5 mg by mouth. 5/16/2017: Received from: [facility] Received Sig: Take 0.5 tablets by mouth every 6 hours if needed for Agitation.     levothyroxine (SYNTHROID, LEVOTHROID) 200 MCG tablet TAKE 1 TAB BY MOUTH ONCE DAILY 5/16/2017: Received from: External Pharmacy     lisinopril (PRINIVIL,ZESTRIL) 5 MG tablet Take 5 mg by mouth daily. 2/20/2018: Received from: External Pharmacy Received Sig: TAKE 1 TAB BY MOUTH ONCE DAILY     metoprolol succinate (TOPROL-XL) 25 MG Take 25 mg by mouth. 5/16/2017: Received from: [facility] Received Sig: Take 1 tablet by mouth once daily. For hypertension     niacin 500 MG tablet Take 500 mg by mouth daily with breakfast.      OLANZapine (ZYPREXA) 20 MG tablet Take 20 mg by mouth bedtime.       omeprazole (PRILOSEC) 20 MG capsule Take 20 mg by mouth. 5/16/2017: Received from: [facility] Received Sig: Take 20 mg by mouth every 24 hours.     perphenazine 8 MG tablet Take 24 mg by mouth 2 (two) times a day.      QUEtiapine (SEROQUEL) 50 MG tablet Take 50 mg by mouth 3 (three)  times a day as needed.      SENEXON-S 8.6-50 mg tablet  8/30/2018: Received from: External Pharmacy     Allergies:  No Known Allergies    Tobacco:   reports that he quit smoking about 16 years ago. He has a 20.00 pack-year smoking history. he has never used smokeless tobacco.     Physical Exam     /68   Pulse 88   Wt (!) 228 lb (103.4 kg)   SpO2 97%   BMI 33.67 kg/m          General Appearance:    Alert, cooperative, no distress, appears stated age   Eyes:   No scleral icterus or conjunctival irritation       Extremities:   Extremities normal, atraumatic, no cyanosis or edema no significant pain on palpation of the back.  No significant limitation of range of motion testing.  Does report some pain with full forward flexion and side to side bend.   Skin:   Skin color, texture, turgor normal, no rashes or lesions   Neurologic:   Normal strength and sensation        throughout on gross examination.  He can heel and toe walk without difficulty.  He can squat return to standing position.  His reflexes are symmetric at +2 in the lower extremities.

## 2021-06-21 NOTE — PROGRESS NOTES
[facility] Daily Progress/discharge summary     Patient Name: Landon Covarrubias  Date: 2/1/2019  Visit #: 3  PTA visit #:  -  Referral Diagnosis: lumbar spine pain  Referring provider: Sol Nieto PA-C  Visit Diagnosis:     ICD-10-CM    1. Lumbar spine pain M54.5    2. Strain of lumbar region, initial encounter S39.012A    3. 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     Disease of thyroid gland      Elevated blood pressure      Encephalopathy acute      Essential hypertension      Hematuria 6/24/2016     Hypercholesterolemia      Hyperlipidemia     Created by Conversion      Hypokalemia 7/5/2016     Hypothyroidism     Created by Conversion      Hypothyroidism     Created by Conversion  Replacement Utility updated for latest IMO load     Impaired fasting glucose     Created by Conversion      Lateral epicondylitis     Created by Conversion  Replacement Utility updated for latest IMO load     Lumbar spinal stenosis 6/8/2015     Mood disorder due to a general medical condition      Other cognitive disorder due to general medical condition      Other specified fever      Penile swelling      Retention of urine 7/16/2016     Schizophrenia (H)     Created by Conversion  Replacement Utility updated for latest IMO load     Schizophrenia, chronic condition (H)      Spondylolisthesis of lumbar region 6/13/2016     Systemic infection (H) 7/5/2016     Undifferentiated schizophrenia (H)      Urinary tract infection 7/5/2016         Assessment:   Patient feels 100% better since beginning therapy and would like to trial independent management of condition.  "He has met his PT goals and now has negative neural tension testing in bilateral lower extremities. His chart will be held open for 30 days and if he does not return, he will be discharged and will need a new order to resume in the future. Patient is agreeable to this plan. He follows up with Sol Nieto PA-C on 12/3/2018.      HEP/POC compliance is  good .  Patient demonstrates understanding/independence with home program.  Patient is benefitting from skilled physical therapy and is making steady progress toward functional goals.  Patient is appropriate to continue with skilled physical therapy intervention, as indicated by initial plan of care.    Goal Status:  Pt. will demonstrate/verbalize independence in self-management of condition in : 12 weeks;Met  Pt. will be independent with home exercise program in : 6 weeks;Met    Patient will decrease : ORVILLE score;by _ points;for improved quality of function;for improved quality of life;in 6 weeks;Met  by ___ points: 6      Plan / Patient Education:     Continue with initial plan of care.  Progress with home program as tolerated.    Exercises:  Exercise #1: supine glut and piriformis stretch  Comment #1: seated HS stretch  Exercise #2: Cat/cow- 5\" holds x5  Comment #2: abd set-sitting, standing, and lying down  Exercise #3: glut set  Comment #3: clamshell w/ L3      Plan for next visit: hold chart open for 30 days    Subjective:     Pain Ratin/10    Ice seems to have helped. Feeling much better. Thinks he's made 100% progress since beginning therapy.      Objective:     No increase in pain with today's session    Lumbar AROM:   Flex- mid tibia, WFL  Ext- WFL, increase in pain  R SB- WFL, pain-free  L SB- WFL, pain-free  R rot- WFL, increase in pain  L rot- WFL, increase in pain    Slump: negative B    Modified Oswestry Low Back Pain Disablity Questionnaire  in %: 14  (previous ORVILLE: 32%)    Good contraction with abd set      Treatment Today     TREATMENT MINUTES " COMMENTS   Evaluation     Self-care/ Home management     Manual therapy     Neuromuscular Re-education     Therapeutic Activity     Therapeutic Exercises 24 Discussed progress and discharge plans. Objective measures taken. Reviewed abd set per patient request. Reviewed HEP. Nu' Step 5' L6. Answered patient questions/concerns. Had patient fill out ORVILLE. Progressed clamshell with resistance band.   Gait training     Modality__________________                Total 24    Blank areas are intentional and mean the treatment did not include these items.       Taylor Blair, PT, DPT  2/1/2019

## 2021-06-21 NOTE — PROGRESS NOTES
[facility] Daily Progress     Patient Name: Landon Covarrubias  Date: 11/20/2018  Visit #: 2  PTA visit #:  -  Referral Diagnosis: lumbar spine pain  Referring provider: Sol Nieto PA-C  Visit Diagnosis:     ICD-10-CM    1. Lumbar spine pain M54.5    2. Strain of lumbar region, initial encounter S39.012A    3. 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     Disease of thyroid gland      Elevated blood pressure      Encephalopathy acute      Essential hypertension      Hematuria 6/24/2016     Hypercholesterolemia      Hyperlipidemia     Created by Conversion      Hypokalemia 7/5/2016     Hypothyroidism     Created by Conversion      Hypothyroidism     Created by Conversion  Replacement Utility updated for latest IMO load     Impaired fasting glucose     Created by Conversion      Lateral epicondylitis     Created by Conversion  Replacement Utility updated for latest IMO load     Lumbar spinal stenosis 6/8/2015     Mood disorder due to a general medical condition      Other cognitive disorder due to general medical condition      Other specified fever      Penile swelling      Retention of urine 7/16/2016     Schizophrenia (H)     Created by Conversion  Replacement Utility updated for latest IMO load     Schizophrenia, chronic condition (H)      Spondylolisthesis of lumbar region 6/13/2016     Systemic infection (H) 7/5/2016     Undifferentiated schizophrenia (H)      Urinary tract infection 7/5/2016         Assessment:   Patient comes in for first f/u PT visit. He reports feeling better since beginning therapy. His exercises seem to  "help.    HEP/POC compliance is  good .  Patient demonstrates understanding/independence with home program.  Patient is benefitting from skilled physical therapy and is making steady progress toward functional goals.  Patient is appropriate to continue with skilled physical therapy intervention, as indicated by initial plan of care.    Goal Status:  Pt. will demonstrate/verbalize independence in self-management of condition in : 12 weeks  Pt. will be independent with home exercise program in : 6 weeks    Patient will decrease : ORVILLE score;by _ points;for improved quality of function;for improved quality of life;in 6 weeks  by ___ points: 6      Plan / Patient Education:     Continue with initial plan of care.  Progress with home program as tolerated.    Exercises:  Exercise #1: supine glut and piriformis stretch  Comment #1: seated HS stretch  Exercise #2: Cat/cow- 5\" holds x5  Comment #2: abd set  Exercise #3: glut set  Comment #3: clamshell      Plan for next visit: review HEP as needed, re-test nerves and motion. Give nerve glides if appropriate. More therapy?    Subjective:     Pain Ratin/10 in low back. 3/10 in upper back from shopping at [employer] today    Exercises seem to help. Takes about 20 minutes to kick in before it helps. Only iced once- didn't seem to help. Heat didn't help.       Objective:     No increase in pain with today's session  Good technique w/ previous exercises upon review    Lumbar AROM:   Flex- mid tibia, WFL  Ext- WFL, pain-free  R SB- WFL, pain-free  L SB- WFL, increase in pain  R rot- WFL, pain-free  L rot- WFL, increase in pain    Slump: positive on L, negative on R      Treatment Today     TREATMENT MINUTES COMMENTS   Evaluation     Self-care/ Home management     Manual therapy     Neuromuscular Re-education     Therapeutic Activity     Therapeutic Exercises 25 Discussed progress. Objective measures taken. Progressed HEP- see flow sheet for details. Reviewed HEP.   Gait training   "   Modality__________________                Total 25    Blank areas are intentional and mean the treatment did not include these items.       Taylor Blair, PT, DPT  11/20/2018

## 2021-06-22 NOTE — PROGRESS NOTES
Assessment:   Landon Covarrubias is a 63 y.o. y.o. male with past medical history significant for hypothyroidism, hypercholesterolemia, schizophrenia, hypertension, penile ulcer who presents today for follow-up regarding a 5-week history of bilateral mid and lower back pain.  Patient has some improvement in his pain with physical therapy.  History of an L4-5 transforaminal lumbar interbody fusion on June 13, 2016 with Dr. Craig.  He does not have any radicular symptoms.  I suspect he may be symptomatic from lumbar facet arthropathy with secondary myofascial pain.  He had reproduction of his pain with lumbar facet maneuvers bilaterally.  He is neurologically intact.       Plan:     A shared decision making plan was used.  The patient's values and choices were respected.  The following represents what was discussed and decided upon by the physician assistant and the patient.      1.  DIAGNOSTIC TESTS: I reviewed the x-ray lumbar spine.  I ordered an MRI lumbar spine for further evaluation.    2.  PHYSICAL THERAPY: Patient attended 3 sessions of physical therapy.  He was discharged home with his home exercise program.  It looked like his chart was held open for 30 days.  Offered for him to return to physical therapy.  He declined.  He prefers to continue with his home exercise program for now.    3.  MEDICATIONS: No changes are made to the patient's medications.  He uses Aleve as needed.    4.  INTERVENTIONS: No interventions were ordered.  Will await the results of the MRI lumbar spine.  At that time I will likely recommend that we try bilateral L5-S1 facet joint injections.    5.  PATIENT EDUCATION: The patient is in agreement the above plan.  All questions were answered.    6.  FOLLOW-UP: A nurse will call the patient with the results of the MRI lumbar spine.  I may order bilateral L5-S1 facet joint injections, based on those results.  If he has any questions or concerns in the meantime, he should not hesitate to  contact our clinic.    Subjective:     Landon Covarrubias is a 63 y.o. male who presents today for follow-up regarding a 5-week history of bilateral mid and lower back pain.  I saw the patient in consultation on November 14.  At that time I referred the patient to physical therapy.  Patient had 3 sessions of physical therapy.  He reports some improvement in his pain.  However, he continues to have significant discomfort.    Patient complains of bilateral mid and lower back pain.  Patient reports that the pain begins at the lumbosacral junction.  He states that it radiates up toward the thoracolumbar junction.  Pain is slightly worse on the left than the right.  He denies any pain radiating into the buttocks or down the legs.  He rates his pain today as a 6 out of 10.  At its best it is a 6 out of 10.  At its worst it is an 8 out of 10.  Patient's pain is aggravated with work.  He works at [employer].  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  Stretching and applying ice helps alleviate his pain.  He denies any new symptoms since he was last seen.  He denies any numbness, tingling, or weakness down the legs.    As mentioned above, patient had 3 sessions of physical therapy.  He is using Aleve as needed.  He is doing his home exercises.    Past medical history is reviewed and is unchanged in the interim.    Family history is reviewed and is unchanged in the interim.    Review of Systems:  Negative for numbness/tingling, loss of bowel/bladder control, footdrop, weakness, headache, dizziness, nausea/vomiting, blurry vision, balance changes.     Objective:   CONSTITUTIONAL:  Vital signs as above.  No acute distress.  The patient is well nourished and well groomed.    PSYCHIATRIC:  The patient is awake, alert, oriented to person, place and time.  The patient is answering questions appropriately with clear speech.  Normal affect.  HEENT: Normocephalic, atraumatic.  Sclera  clear.    SKIN:  Skin over the face, posterior torso, bilateral upper and lower extremities is clean, dry, intact without rashes.  MUSCULOSKELETAL:  Gait is non-antalgic.  The patient is able to heel and toe walk without any difficulty.  Mild tenderness over the bilateral lower lumbar paraspinal muscles at L5-S1.  Lumbar flexion within normal limits.  Lumbar extension mildly restricted.  Lumbar facet maneuvers reproduce low back pain bilaterally.    The patient has 5/5 strength for the bilateral hip flexors, knee flexors/extensors, ankle dorsiflexors/plantar flexors, ankle evertors/invertors.    NEUROLOGICAL: .  Sensation to light touch is intact in the bilateral L4, L5, and S1 dermatomes.       RESULTS:  XR LUMBAR SPINE 2 OR 3 VWS  11/16/2018 2:36 PM     INDICATION: Back pain, history of lumbar fusion  COMPARISON: CT abdomen pelvis 12/02/2016     FINDINGS: 5 lumbar-type vertebral bodies. Small riblets will be assumed at L1. Posterior spinal fusion and instrumentation and anterior interbody fusion are noted at L4-L5. Slight anterolisthesis L4 on L5. Hardware is intact. Small curvilinear density   projected in the L4-L5 interspinous space and likely a laminotomy defect. This is nonspecific. No acute compression fracture deformity. Mild loss of disc space height L1-L2 through L3-L4 and at L5-S1. Multilevel degenerative facet arthropathy greatest at   L5-S1.

## 2021-06-22 NOTE — TELEPHONE ENCOUNTER
----- Message from Sol Nieto PA-C sent at 1/9/2019  2:02 PM CST -----  Can you please call the patient let him know I reviewed his MRI lumbar spine.  This shows some degenerative changes above and below his fusion including arthritis in the joints, which I believe is the source of his pain.  If the patient is still having significant low back pain, recommend bilateral L5-S1 facet joint injections as the next step.

## 2021-06-22 NOTE — TELEPHONE ENCOUNTER
Phone call to patient to review results and provider's recommendations. Results given and explained. Questions answered. Explained if he was still having quite a bit of pain he could have the recommended injections. States PT and he stretches and exercises are helping, but would still like the injection. Order placed for bilateral L5-S1 facet joint injections. Injection requirements reviewed with patient. Stated understanding. Transferred to scheduling to make appointment.

## 2021-06-23 NOTE — PATIENT INSTRUCTIONS - HE
Follow-up visit with Sol Nieto in 2 weeks to discuss injection outcome and determine care plan going forward.       DISCHARGE INSTRUCTIONS    During office hours (8:00 a.m.- 4:30 p.m.) questions or concerns may be answered  by calling Spine Navigation Nurses at  [phone].     If you experience any problems after hours  please call [phone] and you will be connected to [facility].     All Patients:    ? You may experience an increase in your symptoms for the first 2 days (It may take anywhere between 2 days- 2 weeks for the steroid to have maximum effect).    ? You may use ice on the injection site, as frequently as 20 minutes each hour if needed.    ? You may take your pain medicine.    ? You may continue taking your regular medication after your injection. If you have had a Medial Branch Block you may resume pain medication once your pain diary is completed.    ? You may shower. No swimming, tub bath or hot tub for 48 hours.  You may remove your bandaid/bandage as soon as you are home.    ? You may resume light activities, as tolerated.    ? Resume your usual diet as tolerated.    ? It is strongly advised that you do not drive for 1-3 hours post injection.    ? If you have had oral sedation:  Do not drive for 8 hours post injection.      ? If you have had IV sedation:  Do not drive for 24 hours post injection.  Do not operate hazardous machinery or make important personal/business decisions for 24 hours.      POSSIBLE STEROID SIDE EFFECTS (If steroid/cortisone was used for your procedure)    -If you experience these symptoms, it should only last for a short period      Swelling of the legs                Skin redness (flushing)       Mouth (oral) irritation     Blood sugar (glucose) levels              Sweats                      Mood changes    Headache    Sleeplessness         POSSIBLE PROCEDURE SIDE EFFECTS  -Call the Spine Center if you are concerned    Increased Pain              Increased numbness/tingling        Nausea/Vomiting            Bruising/bleeding at site        Redness or swelling                                                Difficulty walking        Weakness           Fever greater than 100.5    *In the event of a severe headache after an epidural steroid injection that is relieved by lying down, please call the [facility] to speak with a clinical staff member*

## 2021-06-23 NOTE — PROGRESS NOTES
Assessment:   Landon Covarrubias is a 63 y.o. y.o. male with past medical history significant for hypothyroidism, hypercholesterolemia, schizophrenia, hypertension, penile ulcer who presents today for follow-up regarding 2 areas of pain.  1.  Bilateral low back pain without radicular symptoms.  Patient is a history of an L4-5 transforaminal lumbar interbody fusion on June 13, 2016.  MRI lumbar spine showed facet arthropathy at L5-S1.  Patient status post a bilateral L5-S1 facet joint injection on January 20, 2019 which has provided 100% relief of his lower back pain.  2.  A 3-week history of bilateral mid back pain.  This pain seems largely myofascial in nature.  He has not had any recent advanced imaging of the thoracic spine.       Plan:     A shared decision making plan was used.  The patient's values and choices were respected.  The following represents what was discussed and decided upon by the physician assistant and the patient.      1.  DIAGNOSTIC TESTS: I reviewed the MRI lumbar spine.  I ordered an x-ray thoracic spine as an initial evaluation.    2.  PHYSICAL THERAPY: No physical therapy was ordered today.  We will await the results of his x-ray.  I will likely refer the patient back to physical therapy.  He had physical therapy focusing on his lower back in November 2018.  I would like the patient to physical therapy focusing on his thoracic spine.    3.  MEDICATIONS: No changes are made to the patient's medications.  He uses Aleve twice daily for pain.    4.  INTERVENTIONS: No interventions were ordered.  -Low back pain were to return, we could repeat the bilateral L5-S1 facet joint injections.  -If mid back pain fails to improve with conservative treatment, we could also consider interventional pain management for this area.      5.  PATIENT EDUCATION: Patient is in agreement the above plan.  All questions were answered.    6.  FOLLOW-UP: A nurse will call the patient with results of his x-ray.  As long  as this does not show any fracture or other unexpected abnormality, I will refer the patient to physical therapy.  If he has any questions or concerns in the meantime, he should not hesitate contact our clinic.    Subjective:     Landon Covarrubias is a 63 y.o. male who presents today for follow-up regarding mid back pain and lower back pain.  Patient status post a bilateral L5-S1 facet joint injection on January 20 of 2019.  Patient reports this injection is provided 100% relief of his lower back pain.  About 3 weeks ago, patient began to feel more pain in the mid back.  He denies any specific injury or event to cause the pain.  He does state that at his work he has to lift chairs and that may have aggravated his mid back.    Patient complains of mid back pain.  Pain spans across the mid thoracic region bilaterally.  Right side is slightly worse than left.  He denies any pain wrapping well.  He denies any pain radiating the buttocks or down the legs.  He denies any numbness, tingling, or weakness.  Pain is rated as a 2 out of 10 today.  At its best it is a 0-10.  At its worst it is a 4-10.  Pain is aggravated with increased activity and alleviated with stretching.    Patient participated in 3 sessions of physical therapy for his lower back in November 2018.  He still does his home exercises.  He states his physical therapist did not teach him exercises for his mid back pain.  He uses Aleve twice daily.    Past medical history is reviewed and is unchanged in the interim.    Family history is reviewed and is unchanged in the interim.    Review of Systems:  Negative for numbness/tingling, loss of bowel/bladder control, footdrop, weakness, headache, dizziness, blurry vision, balance changes, nausea/vomiting.     Objective:   CONSTITUTIONAL:  Vital signs as above.  No acute distress.  The patient is well nourished and well groomed.    PSYCHIATRIC:  The patient is awake, alert, oriented to person, place and time.  The  patient is answering questions appropriately with clear speech.  Normal affect.  HEENT: Normocephalic, atraumatic.  Sclera clear.    SKIN:  Skin over the face, posterior torso, bilateral upper and lower extremities is clean, dry, intact without rashes.  MUSCULOSKELETAL:  Gait is non-antalgic.  Mild tenderness palpation with hypertonicity in the mid thoracic paraspinous muscles.  No significant tenderness over the bilateral lower lumbar paraspinal muscles.      The patient has 5/5 strength for the bilateral hip flexors, knee flexors/extensors, ankle dorsiflexors/plantar flexors, ankle evertors/invertors.    NEUROLOGICAL:    Sensation to light touch is intact in the bilateral L4, L5, and S1 dermatomes.       RESULTS: I reviewed the MRI lumbar spine from [facility] dated January 8, 2019.  This shows postoperative changes of a laminectomy and posterior angel and pedicle screw fixation L4-5.  At L5-S1 there is bilateral facet arthropathy with minimal right foraminal stenosis.  There is persistent moderate to severe right and mild left foraminal stenosis L4-5.  At L3-4 there is mild spinal canal stenosis related to a minimal posterior bulge, ligamentum flavum and facet arthropathy.  There is also moderate left and mild right foraminal stenosis at this level.  At L2-3 there is minimal spinal canal stenosis with moderate right and mild left foraminal stenosis.  Please see report for further details.

## 2021-06-23 NOTE — PATIENT INSTRUCTIONS - HE
[facility]  Address: [address] , [place] [address]   Phone:     [phone]

## 2021-06-24 NOTE — PROGRESS NOTES
[facility] Daily Progress/discharge summary     Patient Name: Landon Covarrubias  Date: 3/12/2019  Visit #: 3  PTA visit #:  -  Referral Diagnosis: thoracic pain   Referring provider: Sol Nieto PA-C  Visit Diagnosis:     ICD-10-CM    1. Pain in thoracic spine M54.6    2. Poor posture R29.3    3. 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     Disease of thyroid gland      Elevated blood pressure      Encephalopathy acute      Essential hypertension      Hematuria 6/24/2016     Hypercholesterolemia      Hyperlipidemia     Created by Conversion      Hypokalemia 7/5/2016     Hypothyroidism     Created by Conversion      Hypothyroidism     Created by Conversion  Replacement Utility updated for latest IMO load     Impaired fasting glucose     Created by Conversion      Lateral epicondylitis     Created by Conversion  Replacement Utility updated for latest IMO load     Lumbar spinal stenosis 6/8/2015     Mood disorder due to a general medical condition      Other cognitive disorder due to general medical condition      Other specified fever      Penile swelling      Retention of urine 7/16/2016     Schizophrenia (H)     Created by Conversion  Replacement Utility updated for latest IMO load     Schizophrenia, chronic condition (H)      Spondylolisthesis of lumbar region 6/13/2016     Systemic infection (H) 7/5/2016     Undifferentiated schizophrenia (H)      Urinary tract infection 7/5/2016         Assessment:   Patient comes in for second f/u PT appointment. He reports feeling 90% better since beginning therapy. Patient feels ready to  "discharge from therapy and will need a new order to resume in the future. He is agreeable to this plan.    HEP/POC compliance is  good .  Patient demonstrates understanding/independence with home program.    Goal Status:  Pt. will demonstrate/verbalize independence in self-management of condition in : 12 weeks;Met  Pt. will be independent with home exercise program in : 12 weeks;Met    Pt will: have pain-free thoracic AROM within 8 weeks in order to improve QOL. (GOAL NOT MET)      Plan / Patient Education:     Exercises:  Exercise #1: SLR 5\" holds x15-20 B  Comment #1: scap sets x10 with 5\" holds  Exercise #2: supine pec stretch 30\" holds x3 (doorway stretch increased LBP- terminated)  Comment #2: rows w/ L3  Exercise #3: SLR  Exercise #4: bird dog  Comment #4: alternating UE and LE  Exercise #5: shoulder ext with green TB  Comment #5: x20 5\" hold.       Subjective:     Pain Ratin    No pain today. When he gets the pain, he does his exercises and the pain resolves. Feels 90% better since beginning therapy.       Objective:     Doorway pec stretch increased LBP; terminated    Thoracic AROM:  Flexion- WFL, pain-free  Ext- WFL, pain-free  R SB- increase in R upper back pain, WFL  L SB- WFL, pain-free  B rot- WFL, pain-free      Treatment Today     TREATMENT MINUTES COMMENTS   Evaluation     Self-care/ Home management     Manual therapy     Neuromuscular Re-education     Therapeutic Activity     Therapeutic Exercises 23 Pt performed in clinic DB B bent over row w/ triceps extension 7# x10, standing DB B abd 3# x15, standing shoulder flex w/ 3# DB, B DB shoulder ext. #4 x15. Modified pec stretch. Discussed progress, goals, and discharge plans with pt.    Gait training     Modality__________________                Total 23    Blank areas are intentional and mean the treatment did not include these items.     Taylor Blair, PT, DPT  3/12/2019    "

## 2021-06-24 NOTE — TELEPHONE ENCOUNTER
Call to pt with provider's results and recommendations. Pt verbalized understanding. Pt would like to begin physical therapy. Informed pt that provider would be notified and a referral would be entered.

## 2021-06-24 NOTE — TELEPHONE ENCOUNTER
Pt aware he needs to follow up with provider in 4 weeks, and that he will be contacted to schedule physical therapy.

## 2021-06-24 NOTE — TELEPHONE ENCOUNTER
----- Message from Sol Nieto PA-C sent at 2/15/2019  7:48 AM CST -----  Please call patient let him know I reviewed his thoracic spine x-ray.  This does not show any fracture.  There is some mild degenerative changes.  I would recommend that the patient begin physical therapy and follow-up with me in 4 weeks.  Please let me know once you spoken with the patient I went to the physical therapy referral.

## 2021-06-24 NOTE — PROGRESS NOTES
[facility] Certification Request    March 5, 2019      Patient: Landon Covarrubias  MR Number: [id number]  YOB: 1955  Date of Visit: 2/19/2019      Dear Sol Nieto,    Thank you for this referral.   We are seeing Landon Covarrubias for Physical Therapy of pain in thoracic spine.    Medicare and/or Medicaid requires physician review and approval of the treatment plan. Please review the plan of care and verify that you agree with the therapy plan of care by co-signing this note.      Plan of Care  Authorization / Certification Start Date: 02/19/19  Authorization / Certification End Date: 05/19/19  Authorization / Certification Number of Visits: 10  Communication with: Referral Source  Patient Related Instruction: Nature of Condition;Next steps;Expected outcome;Treatment plan and rationale;Self Care instruction;Basis of treatment;Body mechanics;Posture;Precautions  Times per Week: 1  Number of Weeks: 12  Number of Visits: 10  Discharge Planning: when PT goals are met  Therapeutic Exercise: ROM;Stretching;Strengthening  Neuromuscular Reeducation: posture;core;kinesio tape  Manual Therapy: soft tissue mobilization;myofascial release;joint mobilization;muscle energy  Equipment: theraband      Goals:  Pt. will demonstrate/verbalize independence in self-management of condition in : 12 weeks  Pt. will be independent with home exercise program in : 12 weeks    Pt will: have pain-free thoracic AROM within 8 weeks in order to improve QOL.        If you have any questions or concerns, please don't hesitate to call.    Sincerely,      Taylor Garcia, PT        Physician recommendation:     ___ Follow therapist's recommendation        ___ Modify therapy      *Physician co-signature indicates they certify the need for these services furnished within this plan and while under their care.      [facility]   [facility] Initial Evaluation    Patient Name: Landon Covarrubias  Date of evaluation:  3/5/2019  Referral Diagnosis: pain in thoracic spine  Referring provider: Sol Nieto PA-C  Visit Diagnosis:     ICD-10-CM    1. Pain in thoracic spine M54.6    2. Poor posture R29.3    3. 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     Disease of thyroid gland      Elevated blood pressure      Encephalopathy acute      Essential hypertension      Hematuria 6/24/2016     Hypercholesterolemia      Hyperlipidemia     Created by Conversion      Hypokalemia 7/5/2016     Hypothyroidism     Created by Conversion      Hypothyroidism     Created by Conversion  Replacement Utility updated for latest IMO load     Impaired fasting glucose     Created by Conversion      Lateral epicondylitis     Created by Conversion  Replacement Utility updated for latest IMO load     Lumbar spinal stenosis 6/8/2015     Mood disorder due to a general medical condition      Other cognitive disorder due to general medical condition      Other specified fever      Penile swelling      Retention of urine 7/16/2016     Schizophrenia (H)     Created by Conversion  Replacement Utility updated for latest IMO load     Schizophrenia, chronic condition (H)      Spondylolisthesis of lumbar region 6/13/2016     Systemic infection (H) 7/5/2016     Undifferentiated schizophrenia (H)      Urinary tract infection 7/5/2016     Assessment:      Landon Covarrubias is a 63 y.o. male who presents to therapy today with chief complaints of acute thoracic spine pain. Symptoms began 1/1/2019 without injury. Difficulty with standing long periods of time, walking long periods of time, lifting chairs  "due to pain.  Pain symptoms are improving.  Patient demonstrates signs and sx consistent with thoracic pain, likely related to poor posture and muscle weakness. PT POC and goals have been discussed with patient and He  is agreeable to these. Patient appears motivated for physical therapy and is appropriate for skilled therapy services.    The POC is dynamic and will be modified on an ongoing basis.  Barriers to achieving goals as noted in the assessment section may affect outcome.  Prognosis to achieve goals is  good   Pt. is appropriate for skilled PT intervention as outlined in the Plan of Care (POC).  Pt. is a good candidate for skilled PT services to improve pain levels and function.    Goals:  Pt. will demonstrate/verbalize independence in self-management of condition in : 12 weeks  Pt. will be independent with home exercise program in : 12 weeks    Pt will: have pain-free thoracic AROM within 8 weeks in order to improve QOL.      Patient's expectations/goals are realistic.    Barriers to Learning or Achieving Goals:  Co-morbidities or other medical factors.  see [facility]       Plan / Patient Instructions:        Plan of Care:   Authorization / Certification Start Date: 02/19/19  Authorization / Certification End Date: 05/19/19  Authorization / Certification Number of Visits: 10  Communication with: Referral Source  Patient Related Instruction: Nature of Condition;Next steps;Expected outcome;Treatment plan and rationale;Self Care instruction;Basis of treatment;Body mechanics;Posture;Precautions  Times per Week: 1  Number of Weeks: 12  Number of Visits: 10  Discharge Planning: when PT goals are met  Therapeutic Exercise: ROM;Stretching;Strengthening  Neuromuscular Reeducation: posture;core;kinesio tape  Manual Therapy: soft tissue mobilization;myofascial release;joint mobilization;muscle energy  Equipment: theraband      Exercises:  Exercise #1: SLR 5\" holds x15-20 B  Comment #1: scap sets x10 with 5\" holds  Exercise " "#2: doorway pec stretch 30\" holds x3  Comment #2: rows w/ L3  Exercise #3: SLR  Exercise #4: bird dog  Comment #4: alternating UE and LE  Exercise #5: shoulder ext with green TB  Comment #5: x20 5\" hold.       Plan for next visit: review HEP. add bird/dog, shoulder ext w/ band, prone \"T\"     Subjective:         Social information:   Occupation: host   Work Status:Working part time      History of Present Illness:    Landon Covarrubias is a 63 y.o. male who presents to therapy today with complaints of acute thoracic pain. Date of onset/duration of symptoms is 1/1/2019. Onset was sudden but no injury. He gets the pain when he gets half-way through his shift (works 5-6 hours at a time). Sitting decreases pain. Symptoms are intermittent and getting better. She denies history of similar symptoms. Hasn't tried ice or heat.    Pain Rating:3  Pain rating at best: 0  Pain rating at worst: 6  Pain description: fatigued    Still doing previous HEP 2x/day:  Exercise #1: supine glut and piriformis stretch  Comment #1: seated HS stretch  Exercise #2: Cat/cow- 5\" holds x5  Comment #2: abd set-sitting, standing, and lying down (not doing much)  Exercise #3: glut set (not doing much)  Comment #3: clamshell w/ L3 (not doing much)  Also doing lumbar extension from previous therapies    Functional limitations are described as occurring with: standing long periods of time, walking long periods of time, lifting chairs       Objective:      Note: Items left blank indicates the item was not performed or not indicated at the time of the evaluation.    Patient Outcome Measures :    Modified Oswestry Low Back Pain Disablity Questionnaire  in %: 20     Scores range from 0-100%, where a score of 0% represents minimal pain and maximal function. The minimal clinically important difference is a score reduction of 12%.    Examination  1. Pain in thoracic spine     2. Poor posture     3. Generalized muscle weakness         Involved side: " Bilateral  Posture Observation: protracted head and shoulders    Thoracic AROM: all WFL. Some increase in pain with rotation bilaterally    Lower Extremity Strength:   4+/5 B, pain-free    Sensation    Intact per subjective    Palpation: no tenderness upon palpation today. When he gets pain, it's around the T5-7 levels bilaterally    B UE strength: 4+/5. Some R shoulder pain with abd shoulder testing.    Treatment Today     TREATMENT MINUTES COMMENTS   Evaluation 15 Low back   Self-care/ Home management     Manual therapy     Neuromuscular Re-education     Therapeutic Activity     Therapeutic Exercises 25 Discussed PT POC and pathology of condition. Answered patient questions. Began HEP-see flowsheet. Recommend patient heat/ice as needed.   Gait training     Modality__________________                Total 40    Blank areas are intentional and mean the treatment did not include these items.            PT Evaluation Code: (Please list factors)  Patient History/Comorbidities: see PMH  Examination: thoracic pain  Clinical Presentation: stable  Clinical Decision Making: low    Patient History/  Comorbidities Examination  (body structures and functions, activity limitations, and/or participation restrictions) Clinical Presentation Clinical Decision Making (Complexity)   No documented Comorbidities or personal factors 1-2 Elements Stable and/or uncomplicated Low   1-2 documented comorbidities or personal factor 3 Elements Evolving clinical presentation with changing characteristics Moderate   3-4 documented comorbidities or personal factors 4 or more Unstable and unpredictable High              Taylor Garcia, PT, DPT  3/5/2019  10:45 AM

## 2021-06-24 NOTE — PROGRESS NOTES
[facility] Daily Progress     Patient Name: Landon Covarrubias  Date: 3/5/2019  Visit #: 2  PTA visit #:  -  Referral Diagnosis: thoracic pain   Referring provider: Sol Nieto PA-C  Visit Diagnosis:     ICD-10-CM    1. Pain in thoracic spine M54.6    2. Poor posture R29.3    3. 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     Disease of thyroid gland      Elevated blood pressure      Encephalopathy acute      Essential hypertension      Hematuria 6/24/2016     Hypercholesterolemia      Hyperlipidemia     Created by Conversion      Hypokalemia 7/5/2016     Hypothyroidism     Created by Conversion      Hypothyroidism     Created by Conversion  Replacement Utility updated for latest IMO load     Impaired fasting glucose     Created by Conversion      Lateral epicondylitis     Created by Conversion  Replacement Utility updated for latest IMO load     Lumbar spinal stenosis 6/8/2015     Mood disorder due to a general medical condition      Other cognitive disorder due to general medical condition      Other specified fever      Penile swelling      Retention of urine 7/16/2016     Schizophrenia (H)     Created by Conversion  Replacement Utility updated for latest IMO load     Schizophrenia, chronic condition (H)      Spondylolisthesis of lumbar region 6/13/2016     Systemic infection (H) 7/5/2016     Undifferentiated schizophrenia (H)      Urinary tract infection 7/5/2016         Assessment:   Patient comes in for first f/u PT appointment. He reports feeling significantly better.    HEP/POC compliance is  good .  Patient demonstrates  "understanding/independence with home program.  Patient is benefitting from skilled physical therapy and is making steady progress toward functional goals.  Patient is appropriate to continue with skilled physical therapy intervention, as indicated by initial plan of care.    Goal Status:  Pt. will demonstrate/verbalize independence in self-management of condition in : 12 weeks  Pt. will be independent with home exercise program in : 12 weeks    Pt will: have pain-free thoracic AROM within 8 weeks in order to improve QOL.      Plan / Patient Education:     Continue with initial plan of care.  Progress with home program as tolerated.    Exercises:  Exercise #1: SLR 5\" holds x15-20 B  Comment #1: scap sets x10 with 5\" holds  Exercise #2: doorway pec stretch 30\" holds x3  Comment #2: rows w/ L3  Exercise #3: SLR  Exercise #4: bird dog  Comment #4: alternating UE and LE  Exercise #5: shoulder ext with green TB  Comment #5: x20 5\" hold.       Plan for next visit: review HEP, check % progress, see if he needs additional PT, goals    Subjective:     Pain Ratin    Pt notes no pain today   Pt notes doing HEP at home and that the exercises relieve pain.   When pt is sitting for long period of times is when he notes pain  Pt notes going from sit to stand agrivates pain       Objective:     Increased pain in R shoulder with prone T's, terminated exercise.       Treatment Today     TREATMENT MINUTES COMMENTS   Evaluation     Self-care/ Home management     Manual therapy     Neuromuscular Re-education     Therapeutic Activity     Therapeutic Exercises 27 Pt performed in clinic DB B bent over row 7# x10, standing DB B abd 3# x15, B DB shoulder ext. #4 x15. Added bird dog and shoulder ext. With green TB to HEP. Discussed progress with pt.    Gait training     Modality__________________                Total 27    Blank areas are intentional and mean the treatment did not include these items.   Lili HUNTER    I attest " that I attended a portion of today s treatment session or was immediately available for today s treatment session to ensure sound judgement of the Physical Therapy student.  Taylor Blair, PT, DPT  3/5/2019

## 2021-06-25 NOTE — TELEPHONE ENCOUNTER
Former patient of ??? & has not established care with another provider.  Please assign refill request to covering provider per Clinic standard process.      RN cannot approve Refill Request    RN can NOT refill this medication med is not covered by policy/route to provider and no pcp. Last office visit: 11/16/2020 Samuel Kovacs MD Last Physical: Visit date not found Last MTM visit: Visit date not found Last visit same specialty: 11/16/2020 Samuel Kovacs MD.  Next visit within 3 mo: Visit date not found  Next physical within 3 mo: Visit date not found      Mack Joseph, [facility] Connection Triage/Med Refill 5/26/2021    Requested Prescriptions   Pending Prescriptions Disp Refills     polyethylene glycol (GLYCOLAX) 17 gram/dose powder [Pharmacy Med Name: POLYETHYLENE GLYCOL 3350 14 ONCE-DAILY DOSES 17GM/1DOSE POWDER] 476 g 10     Sig: TAKE 17 G BY MOUTH DAILY.       GI Medications Refill Protocol Passed - 5/25/2021 10:35 AM        Passed - PCP or prescribing provider visit in last 12 or next 3 months.     Last office visit with prescriber/PCP: 11/16/2020 Samuel Kovacs MD OR same dept: 11/16/2020 Samuel Kovacs MD OR same specialty: 11/16/2020 Samuel Kovacs MD  Last physical: Visit date not found Last MTM visit: Visit date not found   Next visit within 3 mo: Visit date not found  Next physical within 3 mo: Visit date not found  Prescriber OR PCP: Samuel Kovacs MD  Last diagnosis associated with med order: 1. Slow transit constipation  - polyethylene glycol (GLYCOLAX) 17 gram/dose powder [Pharmacy Med Name: POLYETHYLENE GLYCOL 3350 14 ONCE-DAILY DOSES 17GM/1DOSE POWDER]; Take 17 g by mouth daily.  Dispense: 476 g; Refill: 10    2. Undifferentiated schizophrenia (H)  - folic acid (FOLVITE) 400 MCG tablet [Pharmacy Med Name: FOLIC ACID 0.4MG TABLET]; 1 TAB BY MOUTH DAILY (DX: DEFICIENCY)  Dispense: 30 tablet; Refill: 10    If protocol passes may refill for 12 months if within 3 months of last  provider visit (or a total of 15 months).                folic acid (FOLVITE) 400 MCG tablet [Pharmacy Med Name: FOLIC ACID 0.4MG TABLET] 30 tablet 10     Si TAB BY MOUTH DAILY (DX: DEFICIENCY)       There is no refill protocol information for this order

## 2021-06-26 NOTE — PROGRESS NOTES
Correct pharmacy verified with patient and confirmed in snapshot? [x] yes []no    Charge captured ? [x] yes  [] no    Medications Phoned  to Pharmacy [] yes [x]no  Name of Pharmacist:  List Medications, including dose, quantity and instructions      Medication Prescriptions given to patient   [] yes  [x] no   List the name of the drug the prescription was written for.       Medications ordered this visit were e-scribed.  Verified by order class [x] yes  [] no   Cogentin 1 mg; perphenazine 8 mg; Zyprexa 20 mg  Medication changes or discontinuations were communicated to patient's pharmacy: [x] yes  [] no  Provider Note to Pharmacy: Please replace previous prescription with this one.     UA collected [] yes  [x] no    Referrals were made to:   Referral for psych in the community     Future appointment was made: [] yes  [x] no    Dictation completed at time of chart check: [x] yes  [] no    I have checked the documentation for today s encounters and the above information has been reviewed and completed.

## 2021-06-26 NOTE — PROGRESS NOTES
Progress Notes by Mira Wolf MD at 2018  2:40 PM     Author: Mira Wolf MD Service: -- Author Type: Physician    Filed: 2018  5:43 PM Encounter Date: 2018 Status: Signed    : Mira Wolf MD (Physician)       Date of Service:  18    Date last seen by Dr. Wolf:  18    PCP: Samuel Kovacs MD    Impression:  1.  Penile ulceration-improving very nicely-near healed  2.  Penile swelling due to previous cellulitis and history of trauma  3.  Slight post traumatic lymphatic damage to penile shaft-stable and near resolved     Plan:  1. Questions were answered..  2. After permission was obtained 2% Lidocaine HCL jelly was applied, under clean conditions, the distal penis post traumatic/cellulitis ulceration was debrided using currette.  Devitalized and non viable tissue, along with any fibrin and slough, was removed to improve granulation tissue formation, stimulate wound healing, decrease overall bacteria load, disrupt biofilm formation and decrease edge senescence.  Total excisional debridement was 1 sq cm from the epidermis/dermis area.   Ulcer was improved afterwards and .  Measures were as noted on the flow sheet and unchanged after debridement .  3.  Wound treatment will include irrigation and dressings to promote autolytic debridement and will continue to be:  tegaderm AG mesh then light alginate rope then stockinet/tubigrip to secure.  Change daily or when moist.  4. Patient will follow up in 6 weeks.      Time spent with patient 15 minutes with greater than 50% time in consultation, education and coordination of care.   ______________________________________________________________________      Chief Complaint: penile swelling and ulceration     History of Present Illness:     Landon Covarrubias returns to the [facility] with penile ulceration since catheter injury.    He continues to get debridement  with Tegaderm AG mesh then light alginate rope then stockinet/tubigrip to secure with dressing changes daily.  He reports the ulceration continues to improve.  The right side of the ulcer has closed.  The left side is getting smaller.  He has not had any odor.  There is no significant pain.  He has not had any significant discharge.  He has not had a fever.      Past Medical History:   Diagnosis Date   ? Acute Renal Insufficiency     Created by Conversion    ? Acute Renal Insufficiency     Created by Conversion  Replacement Utility updated for latest IMO load   ? Altered mental state 6/23/2016   ? Cellulitis of corpus cavernosum and penis 2018   ? Chronic kidney disease    ? Delirium 6/23/2016   ? Dementia praecox (H) 5/16/2017   ? Disease of thyroid gland    ? Elevated blood pressure    ? Encephalopathy acute    ? Essential hypertension    ? Hematuria 6/24/2016   ? Hypercholesterolemia    ? Hyperlipidemia     Created by Conversion    ? Hypokalemia 7/5/2016   ? Hypothyroidism     Created by Conversion    ? Hypothyroidism     Created by Conversion  Replacement Utility updated for latest IMO load   ? Impaired fasting glucose     Created by Conversion    ? Lateral epicondylitis     Created by Conversion  Replacement Utility updated for latest IMO load   ? Lumbar spinal stenosis 6/8/2015   ? Mood disorder due to a general medical condition    ? 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 Other specified fever    ? Penile swelling    ? Retention of urine 7/16/2016   ? Schizophrenia (H)     Created by Conversion  Replacement Utility updated for latest IMO load   ? Schizophrenia, chronic condition (H)    ? Spondylolisthesis of lumbar region 6/13/2016   ? Systemic infection (H) 7/5/2016   ? Undifferentiated schizophrenia (H)    ? Urinary tract infection 7/5/2016         Current Outpatient Prescriptions:   ?  folic acid (FOLVITE) 400 MCG tablet, TAKE 1 TAB BY MOUTH ONCE DAILY FOR SUPPLEMENT, Disp: , Rfl:  11  ?  glucosamine sulfate 500 mg Tab, Take 1,500 mg by mouth., Disp: , Rfl:   ?  haloperidol (HALDOL) 5 MG tablet, Take 2.5 mg by mouth., Disp: , Rfl:   ?  levothyroxine (SYNTHROID, LEVOTHROID) 200 MCG tablet, TAKE 1 TAB BY MOUTH ONCE DAILY, Disp: , Rfl: 11  ?  lisinopril (PRINIVIL,ZESTRIL) 5 MG tablet, Take 5 mg by mouth daily., Disp: , Rfl: 11  ?  metoprolol succinate (TOPROL-XL) 25 MG, Take 25 mg by mouth., Disp: , Rfl:   ?  niacin 500 MG tablet, Take 500 mg by mouth daily with breakfast., Disp: , Rfl:   ?  OLANZapine (ZYPREXA) 20 MG tablet, Take 20 mg by mouth bedtime. , Disp: , Rfl:   ?  omeprazole (PRILOSEC) 20 MG capsule, Take 20 mg by mouth., Disp: , Rfl:   ?  perphenazine 8 MG tablet, Take 24 mg by mouth 2 (two) times a day., Disp: , Rfl:   ?  QUEtiapine (SEROQUEL) 50 MG tablet, Take 50 mg by mouth 3 (three) times a day as needed., Disp: , Rfl:     No Known Allergies    Social History     Social History   ? Marital status: Single     Spouse name: N/A   ? Number of children: 0   ? Years of education: N/A     Occupational History   ? retired      Social History Main Topics   ? Smoking status: Former Smoker     Packs/day: 1.00     Years: 20.00     Quit date: 2002   ? Smokeless tobacco: Never Used   ? Alcohol use No   ? Drug use: No   ? Sexual activity: No     Other Topics Concern   ? Not on file     Social History Narrative    Single.  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       Family History   Problem Relation Age of Onset   ? Parkinsonism Mother    ? Kidney disease Father    ? No Medical Problems Sister    ? Anesthesia problems Neg Hx        Review of Systems:  Review of systems is otherwise negative, except as noted above.  Full 12 point review of systems was completed.    Imaging:    I personally reviewed the following imaging today and those on care everywhere, if indicated    No results found.    Labs:    No results found for: SEDRATE      No results found for: CRP        Lab Results    Component Value Date    CREATININE 1.09 10/20/2017      Lab Results   Component Value Date    HGBA1C 5.4 01/23/2017           Lab Results   Component Value Date    BUN 13 10/20/2017              Lab Results   Component Value Date    ALBUMIN 3.3 (L) 07/21/2017       Vitamin D, Total (25-Hydroxy)   Date Value Ref Range Status   01/23/2017 36.4 30.0 - 80.0 ng/mL Final       Lab Results   Component Value Date    TSH 3.99 04/21/2017     Lab Results   Component Value Date    WBC 7.7 07/21/2017    HGB 15.4 07/21/2017    HCT 45.0 07/21/2017    MCV 87 07/21/2017     07/21/2017       No results found for: SEDRATE      No results found for: CRP        Lab Results   Component Value Date    CREATININE 1.09 10/20/2017      Lab Results   Component Value Date    HGBA1C 5.4 01/23/2017           Lab Results   Component Value Date    BUN 13 10/20/2017              Lab Results   Component Value Date    ALBUMIN 3.3 (L) 07/21/2017       Vitamin D, Total (25-Hydroxy)   Date Value Ref Range Status   01/23/2017 36.4 30.0 - 80.0 ng/mL Final       Lab Results   Component Value Date    TSH 3.99 04/21/2017     Lab Results   Component Value Date    WBC 7.7 07/21/2017    HGB 15.4 07/21/2017    HCT 45.0 07/21/2017    MCV 87 07/21/2017     07/21/2017     Culture/Gram Stain: Wound   Order: [id number]   Status:  Final result   Visible to patient:  No (Not Released) Next appt:  None Dx:  Chronic ulcer of corpus cavernosum or...   Notes Recorded by Mira Wolf MD on 2/26/2018 at 3:38 PM  Please let the patient know that based on his wound culture I am starting him on penicillin.  He should take the full course.  It has been sent into his pharmacy.  I will see him at his regularly scheduled follow-up.   Culture   2+ Streptococcus group B (!)      1+ Coagulase negative Staphylococcus (!)      2+ Diphtheroids (!)      Stain   No polymorphonuclear leukocytes seen      No organisms seen            Resulting Agency: [facility]    Susceptibility    Streptococcus group B     DEVONTE     Ceftriaxone <=0.0625  Sensitive     Clindamycin 0.0625  Sensitive     Erythromycin 0.0625  Sensitive     Penicillin <=0.48330  Sensitive              Specimen Collected: 02/21/18  1:51 PM Last Resulted: 02/26/18  8:21 AM                Physical Exam:    Vitals:    07/11/18 1415   BP: 122/67   Pulse: 82   Resp: 16   Temp: 98.9  F (37.2  C)     General:  62 y.o. male in no apparent distress.    Psych: Alert and oriented x 3.  Cooperative.  Affect normal.     Genitalia:  Nearly circumferential ulceration of distal penis through dermis/epidermis with slight slough continues to get significantly smaller.  The right side is healed.  The left side now measures 1.7 X 0.7 cm.  Epithelialization continues and there is just slight slough.  No odor. No faustino wound rubor or maceration.  No pain to palpation.  See photos and measures.      Ulceration(s)/Wound(s):     Urethral Catheter Triple-lumen 22 Fr. (Active)       Wound 02/21/18 Penis (Active)   Pre Size Length 1 7/11/2018  2:00 PM   Pre Size Width 0.2 7/11/2018  2:00 PM   Pre Size Depth 0 7/11/2018  2:00 PM   Pre Total Sq cm 0.2 7/11/2018  2:00 PM   Post Size Length 0.8 6/13/2018 12:00 PM   Post Size Width 2.2 6/13/2018 12:00 PM   Post Size Depth 0.01 6/13/2018 12:00 PM   Post Total Sq cm 1.76 6/13/2018 12:00 PM   Undermined n 5/17/2018 12:00 PM   Tunneling n 5/17/2018 12:00 PM   Description red wound bed 5/17/2018 12:00 PM   Prodcut Used Alginate 5/17/2018 12:00 PM       Incision 06/08/15 Back Bilateral (Active)       Incision 06/23/16 Surgical Back (Active)                                           2/21/18                          3/26/18 left side            3/26/18 right side                  4/16/18 left side                  4/1/18 right side            5/17/18 R side                          Left side                  6/13/18 Left                       7/11/18       Mira Wolf MD, ABWMS, FACCWS,  [facility]  Medical Director Wound Care and Lymphedema  [facility]  [phone]

## 2021-06-26 NOTE — PROGRESS NOTES
Progress Notes by Mira Wolf MD at 2/21/2018  1:20 PM     Author: Mira Wolf MD Service: -- Author Type: Physician    Filed: 2/21/2018  4:57 PM Encounter Date: 2/21/2018 Status: Signed    : Mira Wolf MD (Physician)       Referred By: Samuel Kovacs MD    Date Of Service: 02/21/2018    Chief Complaint: penile swelling and ulceration    History of Present Illness:    Landon Covarrubias presents to the [facility], as a new consult, with penile swelling and ulceration.   He has been seen Dr. Salty Lazo in urology.  In September 2016 he developed penile injury from the catheter.  He kept pulling it out.  He has underlying schizophrenia.  Membranous urethral injuries were sustained.  By 2017 he was developing infection in the penile area.  He let it go and did not tell anybody about it.  However, by September 2017 the penis was very swollen and red with infection and ulceration.  He was seen by Dr. Lazo.  He was diagnosed with MRSA, group B strep and Enterobacter cloque.  He was switched from Bactrim to doxycycline.  He did better.  He was again seen January 26 of 2018 and the continued to be ventral distal penis bleeding with penile ulceration of the distal penile shaft.  The continued ulceration he has been sent here and I have been asked to see him.  It is noted he recently had CT scan of the abdomen and pelvis on 12/1/2017 which was essentially negative showing a benign left midpole renal cyst.  The swelling he says is now resolved.  The ulceration remains.  In the past he used therahoney with gauze, dry gauze, saline and gauze with minimal healing.  He is not using anything now.  It is not painful.   The patient sleeps in a bed.  There has been no new numbness, tingling or weakness.  There have been no new masses, rashes, or swellings of any other joints. There has been no new decrease in appetite, unexplained weight loss, abdominal bloating and bowel or  bladder changes    Past Medical History:   Diagnosis Date   ? Acute Renal Insufficiency     Created by Conversion    ? Acute Renal Insufficiency     Created by Conversion  Replacement Utility updated for latest IMO load   ? Altered mental state 6/23/2016   ? Cellulitis of corpus cavernosum and penis 2018   ? Chronic kidney disease    ? Delirium 6/23/2016   ? Dementia praecox 5/16/2017   ? Disease of thyroid gland    ? Elevated blood pressure    ? Encephalopathy acute    ? Essential hypertension    ? Hematuria 6/24/2016   ? Hypercholesterolemia    ? Hyperlipidemia     Created by Conversion    ? Hypokalemia 7/5/2016   ? Hypothyroidism     Created by Conversion    ? Hypothyroidism     Created by Conversion  Replacement Utility updated for latest IMO load   ? Impaired fasting glucose     Created by Conversion    ? Lateral epicondylitis     Created by Conversion  Replacement Utility updated for latest IMO load   ? Lumbar spinal stenosis 6/8/2015   ? Mood disorder due to a general medical condition    ? 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 Other specified fever    ? Penile swelling    ? Retention of urine 7/16/2016   ? Schizophrenia     Created by Conversion  Replacement Utility updated for latest IMO load   ? Schizophrenia, chronic condition    ? Spondylolisthesis of lumbar region 6/13/2016   ? Systemic infection 7/5/2016   ? Undifferentiated schizophrenia    ? Urinary tract infection 7/5/2016       Past Surgical History:   Procedure Laterality Date   ? BACK SURGERY     ? LUMBAR LAMINECTOMY Bilateral 6/8/2015    Procedure: BILATERAL LAMINECTOMY WITH FACET CYST REMOVAL L4-5;  Surgeon: Bob Craig MD;  Location: [facility] OR;  Service:    ? TONSILLECTOMY     ? WISDOM TOOTH EXTRACTION           Current Outpatient Prescriptions:   ?  docoshexanoic acid-eicosapent 500 mg (FISH OIL) 500-100 mg cap capsule, Take 500 mg by mouth daily., Disp: , Rfl:   ?  folic acid (FOLVITE) 400 MCG tablet,  TAKE 1 TAB BY MOUTH ONCE DAILY FOR SUPPLEMENT, Disp: , Rfl: 11  ?  glucosamine sulfate 500 mg Tab, Take 1,500 mg by mouth., Disp: , Rfl:   ?  haloperidol (HALDOL) 5 MG tablet, Take 2.5 mg by mouth., Disp: , Rfl:   ?  levothyroxine (SYNTHROID, LEVOTHROID) 200 MCG tablet, TAKE 1 TAB BY MOUTH ONCE DAILY, Disp: , Rfl: 11  ?  lisinopril (PRINIVIL,ZESTRIL) 5 MG tablet, Take 5 mg by mouth daily., Disp: , Rfl: 11  ?  metoprolol succinate (TOPROL-XL) 25 MG, Take 25 mg by mouth., Disp: , Rfl:   ?  niacin 500 MG tablet, Take 500 mg by mouth daily with breakfast., Disp: , Rfl:   ?  OLANZapine (ZYPREXA) 20 MG tablet, Take 20 mg by mouth bedtime. , Disp: , Rfl:   ?  omeprazole (PRILOSEC) 20 MG capsule, Take 20 mg by mouth., Disp: , Rfl:   ?  perphenazine 8 MG tablet, Take 24 mg by mouth 2 (two) times a day., Disp: , Rfl:   ?  QUEtiapine (SEROQUEL) 50 MG tablet, Take 50 mg by mouth 3 (three) times a day as needed., Disp: , Rfl:     No Known Allergies    Social History     Social History   ? Marital status: Single     Spouse name: N/A   ? Number of children: 0   ? Years of education: N/A     Occupational History   ? retired      Social History Main Topics   ? Smoking status: Former Smoker     Packs/day: 1.00     Years: 20.00     Quit date: 2002   ? Smokeless tobacco: Never Used   ? Alcohol use No   ? Drug use: No   ? Sexual activity: No     Other Topics Concern   ? Not on file     Social History Narrative    Single.  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       Family History   Problem Relation Age of Onset   ? Parkinsonism Mother    ? Kidney disease Father    ? No Medical Problems Sister    ? Anesthesia problems Neg Hx        Review of Systems:  Review of systems is otherwise negative, except as noted above.  Full 12 point review of systems was completed.    Radiology/Diagnostic Studies:   No results found.    Laboratory Studies:    No results found for: SEDRATE      No results found for: CRP        Lab Results    Component Value Date    CREATININE 1.09 10/20/2017      Lab Results   Component Value Date    HGBA1C 5.4 01/23/2017           Lab Results   Component Value Date    BUN 13 10/20/2017              Lab Results   Component Value Date    ALBUMIN 3.3 (L) 07/21/2017       Vitamin D, Total (25-Hydroxy)   Date Value Ref Range Status   01/23/2017 36.4 30.0 - 80.0 ng/mL Final       Lab Results   Component Value Date    TSH 3.99 04/21/2017     Lab Results   Component Value Date    WBC 7.7 07/21/2017    HGB 15.4 07/21/2017    HCT 45.0 07/21/2017    MCV 87 07/21/2017     07/21/2017       Physical Exam:  Vitals:    02/21/18 1252   BP: 132/78   Pulse: 72   Resp: 18   Temp: 98.2  F (36.8  C)       Urethral Catheter Triple-lumen 22 Fr. (Active)       Wound 02/21/18 Penis (Active)   Pre Size Length 1.5 2/21/2018  1:00 PM   Pre Size Width 9 2/21/2018  1:00 PM   Pre Size Depth 0.1 2/21/2018  1:00 PM   Pre Total Sq cm 13.5 2/21/2018  1:00 PM   Undermined n 2/21/2018  1:00 PM   Tunneling n 2/21/2018  1:00 PM   Description red wound bed 2/21/2018  1:00 PM       Incision 06/08/15 Back Bilateral (Active)       Incision 06/23/16 Surgical Back (Active)        General:  62 y.o. male in no apparent distress.  Alert and oriented x 3.  Cooperative. Affect normal.    Respiratory:  Lungs are clear to auscultation throughout with full inspiration    Cardiovascular: Normal S1, S2 without murmur, gallop or rub.  No audible carotid bruits. Regular rhythm.     Abdominal: Normal bowel sounds without any pain, guarding or rigidity. No inguinal lymphadenopathy palpated.    Musculoskeletal:  Normal range of motion in hips, knees and ankles bilaterally throughout .  There is no active joint synovitis, erythema, swelling or joint laxity.     Neurological:  Sensation is intact to pin prick and light touch in both legs.  Strength testing is normal in hip flexion, knee flexion, knee extension, ankle dorsiflexion and great toe extension bilaterally.       Vascular: Dorsalis pedis and posterior tibialis pulses are strong and equal bilaterally. There are no significant telangietasias, medial ankle venous flares, venous varicosities  and spider veins . There is normal capillary refill.     Integumentary: Skin of the legs is uniformly warm and dry.  Nails are normal.    Genitalia:  Nearly circumferential ulceration of distal penis through dermis/epidermis with slight slough.  No odor. No faustino wound rubor or maceration.  Minimal pain to palpation.  Slight fibrosis penile shaft skin noted.        Penis  2/21/18      Impression:  1.  Penile ulceration  2.  Penile swelling due to previous cellulitis and history of trauma  3.  Slight post traumatic lymphatic damage to penile shaft.    Plan:  1. Type of swelling was reviewed in detail with the patient.  Questions were answered and education was completed.  2. After permission was obtained 2% Lidocaine HCL jelly was applied, under clean conditions, the distal penis post traumatic/cellulitis ulceration was debrided using currette.  Devitalized and non viable tissue, along with any fibrin and slough, was removed to improve granulation tissue formation, stimulate wound healing, decrease overall bacteria load, disrupt biofilm formation and decrease edge senescence.  Total excisional debridement was  13 sq cm from the epidermis/dermis area.   Ulcer was  improved afterwards and .  Measures were as noted on the flow sheet and unchanged after debridement .  3. Because of underlying concern of cellulitis and wound infection, Sanchez swab culture technique was used to send off aerobic Gram stain and culture from the penile ulcer.  4.  Wound treatment will include irrigation and dressings to promote autolytic debridement and will be:  microcyn then tegaderm AG mesh then light alginate rope then stockinet/tubigrip to secure.  Change three times a week.   5. Patient will follow up in 2-3 weeks.     Thank you very much for allowing  me to see Landon Covarrubias today.  If you have any questions, please feel free to contact me.    Time spent with patient 60 minutes with greater than 50% time in consultation, education and coordination of care.     Mira Wolf MD, ABWMS, FACCWS, [facility]  Medical Director Wound Care and Lymphedema  [facility]  [phone]

## 2021-06-26 NOTE — PROGRESS NOTES
Pt is here for in office follow up.    No medication refills are needed at this time.    Mood has been good.   Appetite: gets served 3 meals but usually has 2 of them.   Sleep getting 4-6 hrs sleep.     Denies SI/HI thoughts at this time.       MN  to be reviewed by provider.

## 2021-06-26 NOTE — PROGRESS NOTES
"  Outpatient Psychiatric Follow Up    Date of Service: 6/11/2021    --  Chief Complaint: \"things are going good\"     --  History of Present Illness/Client Impression of Mental Health Consult:    Landon Covarrubias is a 65 y.o. male who presents for follow up appointment. Last visit occurred 5/14/21.    Enjoying his job at [employer] still. Observed oral dyskinesia lessened compared to previous visit. Feeling at baseline with symptoms.     States mood is \"good\". Rates depression and anxiety manageable. No sleep or energy maintenance concerns. No appetite or weight concerns. No suicidal and homicidal ideation. No overt psychosis. Denies all other psychiatric symptoms. No new physical concerns.     Medication adherence: Reviewed risk/benefits of medication , Patient able to verbalize understanding of side effects  and Patient verbally consents to taking medications  Medication side effects: none  The patient was given information on medications: currently prescribed    --  [place] Prescription Monitoring Program  No worrisome pharmacy activity.     --  Clinical Outcomes Measures:   PHQ-9: SANDRA  PENNY-7: SANDRA  DISCUS: due next on 11/2021     --  Current Medications:  Current Outpatient Medications   Medication Sig Dispense Refill     benztropine (COGENTIN) 1 MG tablet Take 1 tablet (1 mg total) by mouth 2 (two) times a day. 60 tablet 2     famotidine (PEPCID) 20 MG tablet 1 TAB BY MOUTH EVERY BEDTIME 30 tablet 10     folic acid (FOLVITE) 400 MCG tablet 1 TAB BY MOUTH DAILY (DX: DEFICIENCY) 30 tablet 10     glucosamine sulfate 500 mg Tab Take 1,500 mg by mouth.       levothyroxine (SYNTHROID, LEVOTHROID) 200 MCG tablet 1 TAB BY MOUTH DAILY ON AN EMPTY STOMACH (DX: HYPOTHYROIDISM) 30 tablet 10     lisinopriL (PRINIVIL,ZESTRIL) 5 MG tablet 1 TAB BY MOUTH DAILY (DX: HYPERTENSION) 30 tablet 10     metoprolol succinate (TOPROL-XL) 25 MG 1 TAB BY MOUTH DAILY (DX: HYPERTENSION) 30 tablet 10     niacin 500 MG tablet Take 500 mg by " mouth daily with breakfast.       OLANZapine (ZYPREXA) 20 MG tablet Take 1 tablet (20 mg total) by mouth at bedtime. 30 tablet 2     omeprazole (PRILOSEC) 20 MG capsule Take 20 mg by mouth.       perphenazine 8 MG tablet Take 1 tablet (8 mg total) by mouth 2 (two) times a day. 60 tablet 2     polyethylene glycol (GLYCOLAX) 17 gram/dose powder TAKE 17 G BY MOUTH DAILY. 476 g 10     SENEXON-S 8.6-50 mg tablet 1 TAB BY MOUTH TWICE DAILY (DX: CONSTIPATION) 100 tablet 11     simvastatin (ZOCOR) 20 MG tablet 1 TAB BY MOUTH EVERY EVENING (DX: HYPERLIPIDEMIA) 30 tablet 10     tamsulosin (FLOMAX) 0.4 mg cap 1 CAP BY MOUTH DAILY (DX: URINARY RETENTION) 30 capsule 11     No current facility-administered medications for this visit.        --  Allergies  No Known Allergies  --  Summary of Diagnostic Studies  No visits with results within 30 Day(s) from this visit.   Latest known visit with results is:   Office Visit on 11/16/2020   Component Date Value Ref Range Status     Sodium 11/16/2020 131* 136 - 145 mmol/L Final     Potassium 11/16/2020 5.2* 3.5 - 5.0 mmol/L Final     Chloride 11/16/2020 99  98 - 107 mmol/L Final     CO2 11/16/2020 25  22 - 31 mmol/L Final     Anion Gap, Calculation 11/16/2020 7  5 - 18 mmol/L Final     Glucose 11/16/2020 102  70 - 125 mg/dL Final     BUN 11/16/2020 12  8 - 22 mg/dL Final     Creatinine 11/16/2020 1.28  0.70 - 1.30 mg/dL Final     GFR MDRD Af Amer 11/16/2020 >60  >60 mL/min/1.73m2 Final     GFR MDRD Non Af Amer 11/16/2020 57* >60 mL/min/1.73m2 Final     Bilirubin, Total 11/16/2020 0.5  0.0 - 1.0 mg/dL Final     Calcium 11/16/2020 9.5  8.5 - 10.5 mg/dL Final     Protein, Total 11/16/2020 6.8  6.0 - 8.0 g/dL Final     Albumin 11/16/2020 4.0  3.5 - 5.0 g/dL Final     Alkaline Phosphatase 11/16/2020 118  45 - 120 U/L Final     AST 11/16/2020 24  0 - 40 U/L Final     ALT 11/16/2020 32  0 - 45 U/L Final     WBC 11/16/2020 8.7  4.0 - 11.0 thou/uL Final     RBC 11/16/2020 4.91  4.40 - 6.20 mill/uL  "Final     Hemoglobin 11/16/2020 15.0  14.0 - 18.0 g/dL Final     Hematocrit 11/16/2020 44.6  40.0 - 54.0 % Final     MCV 11/16/2020 91  80 - 100 fL Final     MCH 11/16/2020 30.6  27.0 - 34.0 pg Final     MCHC 11/16/2020 33.7  32.0 - 36.0 g/dL Final     RDW 11/16/2020 12.2  11.0 - 14.5 % Final     Platelets 11/16/2020 288  140 - 440 thou/uL Final     MPV 11/16/2020 6.8* 7.0 - 10.0 fL Final     Cholesterol 11/16/2020 165  <=199 mg/dL Final     Triglycerides 11/16/2020 90  <=149 mg/dL Final     HDL Cholesterol 11/16/2020 53  >=40 mg/dL Final     LDL Calculated 11/16/2020 94  <=129 mg/dL Final     Patient Fasting > 8hrs? 11/16/2020 No   Final     TSH 11/16/2020 0.11* 0.30 - 5.00 uIU/mL Final     Free T4 11/16/2020 1.7  0.7 - 1.8 ng/dL Final       --  Review of Systems  Wt Readings from Last 3 Encounters:   06/11/21 222 lb (100.7 kg)   05/14/21 (!) 224 lb (101.6 kg)   11/16/20 (!) 234 lb 1 oz (106.2 kg)     Temp Readings from Last 3 Encounters:   11/16/20 (!) 96.3  F (35.7  C)   02/11/19 98.3  F (36.8  C) (Oral)   01/28/19 98.7  F (37.1  C)     BP Readings from Last 3 Encounters:   06/11/21 113/68   05/14/21 133/79   11/16/20 130/80     Pulse Readings from Last 3 Encounters:   06/11/21 71   05/14/21 87   11/16/20 87      /68 (Patient Site: Right Arm, Patient Position: Sitting, Cuff Size: Adult Large)   Pulse 71   Ht 5' 9\" (1.753 m)   Wt 222 lb (100.7 kg)   BMI 32.78 kg/m   unable to assess today Pain Score: n/a  Pain Location: n/a     As noted in the subjective section above, otherwise a 10 point review of systems is negative. Limited ability to assess given virtual nature of visit. Review of symptoms based entirely on patient's verbal report and what writer is able to assess via camera if used during appointment.    --  Mental Status Examination    Appearance: appears stated age, clean, well groomed, casually dressed appropriate for weather   Orientation: Patient alert and oriented to person, place, time, " "and situation  Reliability:  Patient appears to be an adequate historian.   Behavior: Patient makes good eye contact and engages with normal rapport in the interview.   There is no evidence of responding to hallucinations or flashbacks.  Speech: Speech is spontaneous and coherent, with a normal rate, rhythm, and tone.    Language: There are no difficulties with expressive or receptive language as observed throughout the interview.    Mood: Described as \"good\".    Affect: Congruent and shows a normal range and level of reactivity.  Judgement: Able to make basic decision regarding safety.  Insight: Good awareness of physical and mental health conditions and aware of needs around care for these.  Gait and station: even, steady gait  Thought process: Logical   Thought content: No evidence of delusions or paranoia.  No thoughts of self-harm or suicide. No thoughts of harming others.  Associations: Connected  Fund of knowledge: Average  Attention / Concentration: Able to remain focused during the interview with minimal distractibility or need for redirection.  Short Term Memory: Grossly intact as evidence by client recalling themes and ideas discussed.  Long Term Memory: Intact  Motor Status: No recent apparent change. Oral dyskinesia.  No current tremor.    --  Diagnostic Impression:  1. Schizophrenia, chronic condition (H)  - AMB REFERRAL TO MENTAL HEALTH AND ADDICTION  - Adult (18+); Psychiatry, ECT and Medication Management; Psychiatry; [facility] [phone]; We will contact you to schedule the appointment or please call with any ques...  - benztropine (COGENTIN) 1 MG tablet; Take 1 tablet (1 mg total) by mouth 2 (two) times a day.  Dispense: 60 tablet; Refill: 5  - OLANZapine (ZYPREXA) 20 MG tablet; Take 1 tablet (20 mg total) by mouth at bedtime.  Dispense: 30 tablet; Refill: 5  - perphenazine 8 MG tablet; Take 1 tablet (8 mg total) by mouth 2 (two) times a day.  Dispense: 60 tablet; " Refill: 5    --  Medical Decision-Making   Landon Covarrubias is a 65 y.o. male who presents for ongoing outpatient psychiatric care. Information today obtained from patient's verbal report and review of records in EHR. Referred by Samuel Kovacs for evaluation of mental health. Previously followed by provider through [facility] and previously [facility]. 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 Currently residing at [facility]. Has . Past medication trials include, but are not limited to: thorazine and unknown.    Reported stability on current medication combinations. Discussed neuroleptic risks and benefits. No reported signs of adverse medication effects but observable oral dyskinesia with small improvements from Cogentin. Reviewed recent lab work. Discussed need for psychiatric care transition planning as writer leaving this location on 6/17/21. Recommended patient continue with ongoing psychiatric care.     Moderate risk. Patient denies suicidal and homicidal ideation. Distant history of 2 suicide attempts in 1990s. Not  at imminent risk this visit. Educated on need to seek emergent services should they become a risk to themselves or others. Landon OLSEN Kaileemadisonsandie verbalized understanding and agreement with this safety plan.    --  Plan  1. Continue to monitor for safety  2. Current Medications  1. Continue benztropine 1mg two times a day for neuroleptic side effects  2. Continue olanzapine 20mg daily for psychosis and mood stabilization  3. Continue perphenazine 8mg daily for psychosis and mood stabilization  4. Neuroleptic Consent Form Signed: verbal consent obtained 12/22/20  5. REFILLS: see above  1. Labs ordered this visit: see above  2. Consider individual psychotherapy appointments for mood stabilization and nonpharmacologic coping. Collaborate with interdisciplinary care team as needed.  3. Release of Information:   1. [facility] in [place], [place] verbal consent obtained 12/22/20  2. [facility] verbal consent obtained 12/22/20  4. Consent to Communicate:  verbal consent obtained 12/22/20  3. Patient will continue abstinence from drugs and alcohol  4. Patient to return to clinic as needed before 6/17/21 for evaluation of medication trials and continued assessment. Ongoing patient psychoeducation regarding chronic illness and treatment.   1. Patient educated that they may schedule sooner appointment or contact writer for any worsening or lack of improvement in symptoms.   5. I reviewed the potential risks, side effects, and benefits of all medications with the patient. Patient verbalized understanding and was encouraged to call clinic with further questions or concerns.    --  Appointment duration: 30 minutes with > 75% spent on coordination of care and psycho-education regarding illness, symptoms, neurobiological basis of disease, substance use, alternative interventions, sleep hygiene, safety planning, care planning, and pharmacology.    Nydia Blank, DNP, APRN, PMHNP-BC  Nurse  Practitioner - Psychiatry    This medical report was made using Dragon Dictation. Spelling and grammatical errors with Dragon exist and are not intentional.

## 2021-06-26 NOTE — PROGRESS NOTES
Progress Notes by Mira Wolf MD at 2018 12:40 PM     Author: Mira Wolf MD Service: -- Author Type: Physician    Filed: 2018  4:24 PM Encounter Date: 2018 Status: Signed    : Mira Wolf MD (Physician)       Date of Service:  18    Date last seen by Dr. Wolf:  2018    PCP: Samuel Kovacs MD    Impression:  1.  Penile ulceration-improving very nicely  2.  Penile swelling due to previous cellulitis and history of trauma  3.  Slight post traumatic lymphatic damage to penile shaft.     Plan:  1. Questions were answered..  2. After permission was obtained 2% Lidocaine HCL jelly was applied, under clean conditions, the distal penis post traumatic/cellulitis ulceration was debrided using currette.  Devitalized and non viable tissue, along with any fibrin and slough, was removed to improve granulation tissue formation, stimulate wound healing, decrease overall bacteria load, disrupt biofilm formation and decrease edge senescence.  Total excisional debridement was 1.3 sq cm from the epidermis/dermis area.   Ulcer was improved afterwards and .  Measures were as noted on the flow sheet and unchanged after debridement .  3.  Wound treatment will include irrigation and dressings to promote autolytic debridement and will continue to be:  tegaderm AG mesh then light alginate rope then stockinet/tubigrip to secure.  Change daily or when moist.  4. Patient will follow up in 4 weeks.      Time spent with patient 15 minutes with greater than 50% time in consultation, education and coordination of care.   ______________________________________________________________________      Chief Complaint: penile swelling and ulceration     History of Present Illness:     [facility].  When I first saw him I debrided the  ulceration and started him on microcyn then tegaderm AG mesh then light alginate rope then stockinet/tubigrip to secure changing dressings three times a week.  Nurses reported the dressings being very wet.  He was debrided again and changed to tegaderm AG mesh then light alginate rope then stockinet/tubigrip to secure.  Change daily.  Been improving.  Debridement was continued with the same dressings.  He is here for his follow-up.  He reports things are much better.  The right side of the ulcer has almost totally healed.  The left side is improving nicely.  He has not had any odor.  There is no significant pain.  He has not had any significant discharge.  He has not had a fever.      Past Medical History:   Diagnosis Date   ? Acute Renal Insufficiency     Created by Conversion    ? Acute Renal Insufficiency     Created by Conversion  Replacement Utility updated for latest IMO load   ? Altered mental state 6/23/2016   ? Cellulitis of corpus cavernosum and penis 2018   ? Chronic kidney disease    ? Delirium 6/23/2016   ? Dementia praecox 5/16/2017   ? Disease of thyroid gland    ? Elevated blood pressure    ? Encephalopathy acute    ? Essential hypertension    ? Hematuria 6/24/2016   ? Hypercholesterolemia    ? Hyperlipidemia     Created by Conversion    ? Hypokalemia 7/5/2016   ? Hypothyroidism     Created by Conversion    ? Hypothyroidism     Created by Conversion  Replacement Utility updated for latest IMO load   ? Impaired fasting glucose     Created by Conversion    ? Lateral epicondylitis     Created by Conversion  Replacement Utility updated for latest IMO load   ? Lumbar spinal stenosis 6/8/2015   ? Mood disorder due to a general medical condition    ? 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 Other specified fever    ? Penile swelling    ? Retention of urine 7/16/2016   ? Schizophrenia     Created by Conversion  Replacement Utility updated for latest IMO load   ? Schizophrenia, chronic  condition    ? Spondylolisthesis of lumbar region 6/13/2016   ? Systemic infection 7/5/2016   ? Undifferentiated schizophrenia    ? Urinary tract infection 7/5/2016         Current Outpatient Prescriptions:   ?  folic acid (FOLVITE) 400 MCG tablet, TAKE 1 TAB BY MOUTH ONCE DAILY FOR SUPPLEMENT, Disp: , Rfl: 11  ?  glucosamine sulfate 500 mg Tab, Take 1,500 mg by mouth., Disp: , Rfl:   ?  haloperidol (HALDOL) 5 MG tablet, Take 2.5 mg by mouth., Disp: , Rfl:   ?  levothyroxine (SYNTHROID, LEVOTHROID) 200 MCG tablet, TAKE 1 TAB BY MOUTH ONCE DAILY, Disp: , Rfl: 11  ?  lisinopril (PRINIVIL,ZESTRIL) 5 MG tablet, Take 5 mg by mouth daily., Disp: , Rfl: 11  ?  metoprolol succinate (TOPROL-XL) 25 MG, Take 25 mg by mouth., Disp: , Rfl:   ?  niacin 500 MG tablet, Take 500 mg by mouth daily with breakfast., Disp: , Rfl:   ?  OLANZapine (ZYPREXA) 20 MG tablet, Take 20 mg by mouth bedtime. , Disp: , Rfl:   ?  omeprazole (PRILOSEC) 20 MG capsule, Take 20 mg by mouth., Disp: , Rfl:   ?  perphenazine 8 MG tablet, Take 24 mg by mouth 2 (two) times a day., Disp: , Rfl:   ?  QUEtiapine (SEROQUEL) 50 MG tablet, Take 50 mg by mouth 3 (three) times a day as needed., Disp: , Rfl:     No Known Allergies    Social History     Social History   ? Marital status: Single     Spouse name: N/A   ? Number of children: 0   ? Years of education: N/A     Occupational History   ? retired      Social History Main Topics   ? Smoking status: Former Smoker     Packs/day: 1.00     Years: 20.00     Quit date: 2002   ? Smokeless tobacco: Never Used   ? Alcohol use No   ? Drug use: No   ? Sexual activity: No     Other Topics Concern   ? Not on file     Social History Narrative    Single.  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       Family History   Problem Relation Age of Onset   ? Parkinsonism Mother    ? Kidney disease Father    ? No Medical Problems Sister    ? Anesthesia problems Neg Hx        Review of Systems:  Review of systems is  otherwise negative, except as noted above.  Full 12 point review of systems was completed.    Imaging:    No results found.    Labs:    No results found for: SEDRATE      No results found for: CRP        Lab Results   Component Value Date    CREATININE 1.09 10/20/2017      Lab Results   Component Value Date    HGBA1C 5.4 01/23/2017           Lab Results   Component Value Date    BUN 13 10/20/2017              Lab Results   Component Value Date    ALBUMIN 3.3 (L) 07/21/2017       Vitamin D, Total (25-Hydroxy)   Date Value Ref Range Status   01/23/2017 36.4 30.0 - 80.0 ng/mL Final       Lab Results   Component Value Date    TSH 3.99 04/21/2017     Lab Results   Component Value Date    WBC 7.7 07/21/2017    HGB 15.4 07/21/2017    HCT 45.0 07/21/2017    MCV 87 07/21/2017     07/21/2017     Culture/Gram Stain: Wound   Order: [id number]   Status:  Final result   Visible to patient:  No (Not Released) Next appt:  None Dx:  Chronic ulcer of corpus cavernosum or...   Notes Recorded by Mira Wolf MD on 2/26/2018 at 3:38 PM  Please let the patient know that based on his wound culture I am starting him on penicillin.  He should take the full course.  It has been sent into his pharmacy.  I will see him at his regularly scheduled follow-up.   Culture   2+ Streptococcus group B (!)      1+ Coagulase negative Staphylococcus (!)      2+ Diphtheroids (!)      Stain   No polymorphonuclear leukocytes seen      No organisms seen            Resulting Agency: [facility]   Susceptibility    Streptococcus group B     DEVONTE     Ceftriaxone <=0.0625  Sensitive     Clindamycin 0.0625  Sensitive     Erythromycin 0.0625  Sensitive     Penicillin <=0.48855  Sensitive              Specimen Collected: 02/21/18  1:51 PM Last Resulted: 02/26/18  8:21 AM                Physical Exam:    Vitals:    05/17/18 1251   BP: 100/62   Pulse: 95   Resp: 18   Temp: 99  F (37.2  C)   SpO2: 95%       General:  62 y.o. male in no apparent distress.  Alert  and oriented x 3.  Cooperative.  Affect normal.     Genitalia:  Nearly circumferential ulceration of distal penis through dermis/epidermis with slight slough has gotten significantly smaller.  Right side is almost totally closed.  The left side has decreased significantly.    No odor. No faustino wound rubor or maceration.  No pain to palpation. Increased epithelialization from the periphery of the ulcerations.  See photos and measures.      Ulceration(s)/Wound(s):     Urethral Catheter Triple-lumen 22 Fr. (Active)       Wound 02/21/18 Penis (Active)   Pre Size Length 1 5/17/2018 12:00 PM   Pre Size Width 2.5 5/17/2018 12:00 PM   Pre Size Depth 0.1 5/17/2018 12:00 PM   Pre Total Sq cm 2.5 5/17/2018 12:00 PM   Undermined n 5/17/2018 12:00 PM   Tunneling n 5/17/2018 12:00 PM   Description red wound bed 5/17/2018 12:00 PM   Prodcut Used Alginate 5/17/2018 12:00 PM       Incision 06/08/15 Back Bilateral (Active)       Incision 06/23/16 Surgical Back (Active)                                     2/21/18                          3/26/18 left side            3/26/18 right side                  4/16/18 left side                  4/1/18 right side            5/17/18 R side                          Left side                      Mira Wolf MD, ABWMS, FACCWS, [facility]  Medical Director Wound Care and Lymphedema  [facility]  [phone]

## 2021-06-26 NOTE — PROGRESS NOTES
Progress Notes by Mira Wolf MD at 11/26/2018 11:00 AM     Author: Mira Wolf MD Service: -- Author Type: Physician    Filed: 11/26/2018  5:08 PM Encounter Date: 11/26/2018 Status: Signed    : Mira Wolf MD (Physician)       Date of Service:  11/26/18    Date last seen by Dr. Wolf:  10/11/18    PCP: Samuel Kovacs MD    Impression:  1. Penile ulceration-healed     Plan:  1. Questions were answered.  2. He is doing very well.  The ulceration has closed.  We discussed how to protect the skin as it will take time to get stronger.  3. I will see him on a as needed basis.  If he has any trouble is to give us a call.    Time spent with patient 10 minutes with greater than 50% time in consultation, education and coordination of care.   ______________________________________________________________________      Chief Complaint: penile swelling and ulceration     History of Present Illness:     Landon Covarrubias returns to the [facility] with penile ulceration since catheter injury.    He reports the ulceration has healed.  There is no pain.  He has not had any significant discharge.  He has not had a fever.      Past Medical History:   Diagnosis Date   ? Acute Renal Insufficiency     Created by Conversion    ? Acute Renal Insufficiency     Created by Conversion  Replacement Utility updated for latest IMO load   ? Altered mental state 6/23/2016   ? Cellulitis of corpus cavernosum and penis 2018   ? Chronic kidney disease    ? Delirium 6/23/2016   ? Dementia praecox (H) 5/16/2017   ? Disease of thyroid gland    ? Elevated blood pressure    ? Encephalopathy acute    ? Essential hypertension    ? Hematuria 6/24/2016   ? Hypercholesterolemia    ? Hyperlipidemia     Created by Conversion    ? Hypokalemia 7/5/2016   ? Hypothyroidism     Created by Conversion    ? Hypothyroidism     Created by Conversion  Replacement Utility updated for latest  IMO load   ? Impaired fasting glucose     Created by Conversion    ? Lateral epicondylitis     Created by Conversion  Replacement Utility updated for latest IMO load   ? Lumbar spinal stenosis 6/8/2015   ? Mood disorder due to a general medical condition    ? 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 Other specified fever    ? Penile swelling    ? Retention of urine 7/16/2016   ? Schizophrenia (H)     Created by Conversion  Replacement Utility updated for latest IMO load   ? Schizophrenia, chronic condition (H)    ? Spondylolisthesis of lumbar region 6/13/2016   ? Systemic infection (H) 7/5/2016   ? Undifferentiated schizophrenia (H)    ? Urinary tract infection 7/5/2016         Current Outpatient Medications:   ?  folic acid (FOLVITE) 400 MCG tablet, TAKE 1 TAB BY MOUTH ONCE DAILY FOR SUPPLEMENT, Disp: , Rfl: 11  ?  glucosamine sulfate 500 mg Tab, Take 1,500 mg by mouth., Disp: , Rfl:   ?  haloperidol (HALDOL) 5 MG tablet, Take 2.5 mg by mouth., Disp: , Rfl:   ?  levothyroxine (SYNTHROID, LEVOTHROID) 200 MCG tablet, TAKE 1 TAB BY MOUTH ONCE DAILY, Disp: , Rfl: 11  ?  lisinopril (PRINIVIL,ZESTRIL) 5 MG tablet, Take 5 mg by mouth daily., Disp: , Rfl: 11  ?  metoprolol succinate (TOPROL-XL) 25 MG, Take 25 mg by mouth., Disp: , Rfl:   ?  niacin 500 MG tablet, Take 500 mg by mouth daily with breakfast., Disp: , Rfl:   ?  OLANZapine (ZYPREXA) 20 MG tablet, Take 20 mg by mouth bedtime. , Disp: , Rfl:   ?  omeprazole (PRILOSEC) 20 MG capsule, Take 20 mg by mouth., Disp: , Rfl:   ?  perphenazine 8 MG tablet, Take 24 mg by mouth 2 (two) times a day., Disp: , Rfl:   ?  QUEtiapine (SEROQUEL) 50 MG tablet, Take 50 mg by mouth 3 (three) times a day as needed., Disp: , Rfl:   ?  SENEXON-S 8.6-50 mg tablet, , Disp: , Rfl:   ?  simvastatin (ZOCOR) 20 MG tablet, Take 20 mg by mouth at bedtime., Disp: , Rfl:   ?  tamsulosin (FLOMAX) 0.4 mg cap, Take 0.4 mg by mouth daily., Disp: , Rfl:     No Known  Allergies    Social History     Socioeconomic History   ? Marital status: Single     Spouse name: Not on file   ? Number of children: 0   ? Years of education: Not on file   ? Highest education level: Not on file   Social Needs   ? Financial resource strain: Not on file   ? Food insecurity - worry: Not on file   ? Food insecurity - inability: Not on file   ? Transportation needs - medical: Not on file   ? Transportation needs - non-medical: Not on file   Occupational History   ? Occupation: retired   Tobacco Use   ? Smoking status: Former Smoker     Packs/day: 1.00     Years: 20.00     Pack years: 20.00     Last attempt to quit:      Years since quittin.9   ? Smokeless tobacco: Never Used   Substance and Sexual Activity   ? Alcohol use: No   ? Drug use: No   ? Sexual activity: No   Other Topics Concern   ? Not on file   Social History Narrative    Single.  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       Family History   Problem Relation Age of Onset   ? Parkinsonism Mother    ? Kidney disease Father    ? No Medical Problems Sister    ? Anesthesia problems Neg Hx        Review of Systems:  Review of systems is otherwise negative, except as noted above.  Full 12 point review of systems was completed.    Imaging:    I personally reviewed the following imaging today and those on care everywhere, if indicated    Xr Lumbar Spine 2 Or 3 Vws    Result Date: 2018  XR LUMBAR SPINE 2 OR 3 VWS 2018 2:36 PM INDICATION: Back pain, history of lumbar fusion COMPARISON: CT abdomen pelvis 2016 FINDINGS: 5 lumbar-type vertebral bodies. Small riblets will be assumed at L1. Posterior spinal fusion and instrumentation and anterior interbody fusion are noted at L4-L5. Slight anterolisthesis L4 on L5. Hardware is intact. Small curvilinear density projected in the L4-L5 interspinous space and likely a laminotomy defect. This is nonspecific. No acute compression fracture deformity. Mild loss of disc space  height L1-L2 through L3-L4 and at L5-S1. Multilevel degenerative facet arthropathy greatest at  L5-S1.      Labs:    I personally reviewed the following labs today and those on care everywhere, if indicated    No results found for: SEDRATE      No results found for: CRP        Lab Results   Component Value Date    CREATININE 1.09 10/20/2017      Lab Results   Component Value Date    HGBA1C 5.4 01/23/2017           Lab Results   Component Value Date    BUN 13 10/20/2017              Lab Results   Component Value Date    ALBUMIN 3.3 (L) 07/21/2017       Vitamin D, Total (25-Hydroxy)   Date Value Ref Range Status   01/23/2017 36.4 30.0 - 80.0 ng/mL Final       Lab Results   Component Value Date    TSH 3.99 04/21/2017     Lab Results   Component Value Date    WBC 7.7 07/21/2017    HGB 15.4 07/21/2017    HCT 45.0 07/21/2017    MCV 87 07/21/2017     07/21/2017     Nothing new to review      Physical Exam:    Vitals:    11/26/18 1119   BP: 110/62   Pulse: 88   Resp: 18   Temp: 98.4  F (36.9  C)     General:  63 y.o. male in no apparent distress.    Psych: Alert and oriented x 3.  Cooperative.  Affect normal.     Genitalia:   The penile ulceration is closed. There is no odor or discharge.  There is no redness or pain to palpation.      Ulceration(s)/Wound(s):     Urethral Catheter Triple-lumen 22 Fr. (Active)       Wound 02/21/18 Penis (Active)   Pre Size Length 0.4 8/30/2018 11:00 AM   Pre Size Width 1.8 8/30/2018 11:00 AM   Pre Size Depth 0 8/30/2018 11:00 AM   Pre Total Sq cm 0.72 8/30/2018 11:00 AM   Post Size Length 0.6 8/30/2018 11:00 AM   Post Size Width 0.3 8/30/2018 11:00 AM   Post Size Depth 0.01 8/30/2018 11:00 AM   Post Total Sq cm 0.18 8/30/2018 11:00 AM   Undermined n 5/17/2018 12:00 PM   Tunneling n 5/17/2018 12:00 PM   Description healed 11/26/2018 11:00 AM   Prodcut Used Alginate 5/17/2018 12:00 PM       Incision 06/08/15 Back Bilateral (Active)       Incision 06/23/16 Surgical Back (Active)                                   2/21/18                          3/26/18 left side            3/26/18 right side                  4/16/18 left side                  5/17/18    Left side                     7/11/18 8/30/28       Mira Wolf MD, ABWMS, FACCWS, [facility]  Medical Director Wound Care and Lymphedema  [facility]  [phone]

## 2021-06-26 NOTE — PROGRESS NOTES
Progress Notes by Mira Wolf MD at 3/26/2018 12:40 PM     Author: Mira Wolf MD Service: -- Author Type: Physician    Filed: 3/26/2018  4:06 PM Encounter Date: 3/26/2018 Status: Signed    : Mira Wolf MD (Physician)       Date of Service:  03/26/18    Date last seen by Dr. Wolf:  3/15/2018    PCP: Samuel Kovacs MD    Impression:  1.  Penile ulceration-improving  2.  Penile swelling due to previous cellulitis and history of trauma  3.  Slight post traumatic lymphatic damage to penile shaft.     Plan:  1. Questions were answered..  2. After permission was obtained 2% Lidocaine HCL jelly was applied, under clean conditions, the distal penis post traumatic/cellulitis ulceration was debrided using currette.  Devitalized and non viable tissue, along with any fibrin and slough, was removed to improve granulation tissue formation, stimulate wound healing, decrease overall bacteria load, disrupt biofilm formation and decrease edge senescence.  Total excisional debridement was 6 sq cm from the epidermis/dermis area.   Ulcer was improved afterwards and .  Measures were as noted on the flow sheet and unchanged after debridement .  3.  Wound treatment will include irrigation and dressings to promote autolytic debridement and will continue to be:  tegaderm AG mesh then light alginate rope then stockinet/tubigrip to secure.  Change daily or when moist.  4. Patient will follow up in 2 weeks.      Time spent with patient 15 minutes with greater than 50% time in consultation, education and coordination of care.   ______________________________________________________________________      Chief Complaint: penile swelling and ulceration     History of Present Illness:     Landon Covarrubias returns to the [facility] with penile swelling and ulceration.   He has been seen Dr. Salty Lazo in urology.  In September 2016 he developed penile injury from the catheter.   He kept pulling it out.  He has underlying schizophrenia.  Membranous urethral injuries were sustained.  By 2017 he was developing infection in the penile area.  He let it go and did not tell anybody about it.  By September 2017 the penis was very swollen and red with infection and ulceration.  He was seen by Dr. Lazo.  He was diagnosed with MRSA, group B strep and Enterobacter cloque.  He was switched from Bactrim to doxycycline.  He did better.  He was again seen January 26 of 2018 and the continued to be ventral distal penis bleeding with penile ulceration of the distal penile shaft.  He used therahoney with gauze, dry gauze, saline and gauze with minimal healing. The continued ulceration he was sent here.  CT scan of the abdomen and pelvis on 12/1/2017 which was essentially negative showing a benign left midpole renal cyst.  The swelling resolved.  When I first saw him I debrided the ulceration and started him on microcyn then tegaderm AG mesh then light alginate rope then stockinet/tubigrip to secure changing dressings three times a week.  Nurses reported the dressings being very wet. He was debrided again and changed to tegaderm AG mesh then light alginate rope  then stockinet/tubigrip to secure.  Change daily.  He is here after one week.  He still gets drainage and has had to change the dressings more frequently. He states it is not painful.        Past Medical History:   Diagnosis Date   ? Acute Renal Insufficiency     Created by Conversion    ? Acute Renal Insufficiency     Created by Conversion  Replacement Utility updated for latest IMO load   ? Altered mental state 6/23/2016   ? Cellulitis of corpus cavernosum and penis 2018   ? Chronic kidney disease    ? Delirium 6/23/2016   ? Dementia praecox 5/16/2017   ? Disease of thyroid gland    ? Elevated blood pressure    ? Encephalopathy acute    ? Essential hypertension    ? Hematuria 6/24/2016   ? Hypercholesterolemia    ? Hyperlipidemia     Created by  Conversion    ? Hypokalemia 7/5/2016   ? Hypothyroidism     Created by Conversion    ? Hypothyroidism     Created by Conversion  Replacement Utility updated for latest IMO load   ? Impaired fasting glucose     Created by Conversion    ? Lateral epicondylitis     Created by Conversion  Replacement Utility updated for latest IMO load   ? Lumbar spinal stenosis 6/8/2015   ? Mood disorder due to a general medical condition    ? 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 Other specified fever    ? Penile swelling    ? Retention of urine 7/16/2016   ? Schizophrenia     Created by Conversion  Replacement Utility updated for latest IMO load   ? Schizophrenia, chronic condition    ? Spondylolisthesis of lumbar region 6/13/2016   ? Systemic infection 7/5/2016   ? Undifferentiated schizophrenia    ? Urinary tract infection 7/5/2016         Current Outpatient Prescriptions:   ?  folic acid (FOLVITE) 400 MCG tablet, TAKE 1 TAB BY MOUTH ONCE DAILY FOR SUPPLEMENT, Disp: , Rfl: 11  ?  glucosamine sulfate 500 mg Tab, Take 1,500 mg by mouth., Disp: , Rfl:   ?  haloperidol (HALDOL) 5 MG tablet, Take 2.5 mg by mouth., Disp: , Rfl:   ?  levothyroxine (SYNTHROID, LEVOTHROID) 200 MCG tablet, TAKE 1 TAB BY MOUTH ONCE DAILY, Disp: , Rfl: 11  ?  lisinopril (PRINIVIL,ZESTRIL) 5 MG tablet, Take 5 mg by mouth daily., Disp: , Rfl: 11  ?  metoprolol succinate (TOPROL-XL) 25 MG, Take 25 mg by mouth., Disp: , Rfl:   ?  niacin 500 MG tablet, Take 500 mg by mouth daily with breakfast., Disp: , Rfl:   ?  OLANZapine (ZYPREXA) 20 MG tablet, Take 20 mg by mouth bedtime. , Disp: , Rfl:   ?  omeprazole (PRILOSEC) 20 MG capsule, Take 20 mg by mouth., Disp: , Rfl:   ?  perphenazine 8 MG tablet, Take 24 mg by mouth 2 (two) times a day., Disp: , Rfl:   ?  QUEtiapine (SEROQUEL) 50 MG tablet, Take 50 mg by mouth 3 (three) times a day as needed., Disp: , Rfl:     No Known Allergies    Social History     Social History   ? Marital status:  Single     Spouse name: N/A   ? Number of children: 0   ? Years of education: N/A     Occupational History   ? retired      Social History Main Topics   ? Smoking status: Former Smoker     Packs/day: 1.00     Years: 20.00     Quit date: 2002   ? Smokeless tobacco: Never Used   ? Alcohol use No   ? Drug use: No   ? Sexual activity: No     Other Topics Concern   ? Not on file     Social History Narrative    Single.  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       Family History   Problem Relation Age of Onset   ? Parkinsonism Mother    ? Kidney disease Father    ? No Medical Problems Sister    ? Anesthesia problems Neg Hx        Review of Systems:  Review of systems is otherwise negative, except as noted above.  Full 12 point review of systems was completed.    Imaging:    No results found.    Labs:    No results found for: SEDRATE      No results found for: CRP        Lab Results   Component Value Date    CREATININE 1.09 10/20/2017      Lab Results   Component Value Date    HGBA1C 5.4 01/23/2017           Lab Results   Component Value Date    BUN 13 10/20/2017              Lab Results   Component Value Date    ALBUMIN 3.3 (L) 07/21/2017       Vitamin D, Total (25-Hydroxy)   Date Value Ref Range Status   01/23/2017 36.4 30.0 - 80.0 ng/mL Final       Lab Results   Component Value Date    TSH 3.99 04/21/2017     Lab Results   Component Value Date    WBC 7.7 07/21/2017    HGB 15.4 07/21/2017    HCT 45.0 07/21/2017    MCV 87 07/21/2017     07/21/2017     Culture/Gram Stain: Wound   Order: [id number]   Status:  Final result   Visible to patient:  No (Not Released) Next appt:  None Dx:  Chronic ulcer of corpus cavernosum or...   Notes Recorded by Mira Wolf MD on 2/26/2018 at 3:38 PM  Please let the patient know that based on his wound culture I am starting him on penicillin.  He should take the full course.  It has been sent into his pharmacy.  I will see him at his regularly scheduled follow-up.    Culture   2+ Streptococcus group B (!)      1+ Coagulase negative Staphylococcus (!)      2+ Diphtheroids (!)      Stain   No polymorphonuclear leukocytes seen      No organisms seen            Resulting Agency: [facility]   Susceptibility    Streptococcus group B     DEVONTE     Ceftriaxone <=0.0625  Sensitive     Clindamycin 0.0625  Sensitive     Erythromycin 0.0625  Sensitive     Penicillin <=0.11256  Sensitive              Specimen Collected: 02/21/18  1:51 PM Last Resulted: 02/26/18  8:21 AM                Physical Exam:    Vitals:    03/26/18 1100   BP: 118/70   Pulse: 88   Resp: 18   Temp: 99.1  F (37.3  C)       General:  62 y.o. male in no apparent distress.  Alert and oriented x 3.  Cooperative.  Affect normal.     Genitalia:  Nearly circumferential ulceration of distal penis through dermis/epidermis with slight slough has gotten smaller.  No odor. No faustino wound rubor or maceration.  No pain to palpation. Increased epithelialization from the periphery of the ulcerations with improved bleeding.  See photos and measures.      Ulceration(s)/Wound(s):     Urethral Catheter Triple-lumen 22 Fr. (Active)       Wound 02/21/18 Penis (Active)   Pre Size Length 1 3/26/2018 12:00 PM   Pre Size Width 8 3/26/2018 12:00 PM   Pre Size Depth 0.1 3/26/2018 12:00 PM   Pre Total Sq cm 8 3/26/2018 12:00 PM   Undermined n 3/26/2018 12:00 PM   Tunneling n 3/26/2018 12:00 PM   Description red wound bed 3/26/2018 12:00 PM   Prodcut Used Alginate 3/26/2018 12:00 PM       Incision 06/08/15 Back Bilateral (Active)       Incision 06/23/16 Surgical Back (Active)                   2/21/18                          3/26/18 left side            3/26/18 right side    Mira Wolf MD, ABWMS, FACCWS, [facility]  Medical Director Wound Care and Lymphedema  [facility]  [phone]

## 2021-06-26 NOTE — PROGRESS NOTES
Progress Notes by Mira Wolf MD at 3/15/2018 11:00 AM     Author: Mira Wolf MD Service: -- Author Type: Physician    Filed: 3/15/2018 12:23 PM Encounter Date: 3/15/2018 Status: Signed    : Mira Wolf MD (Physician)       Date of Service: 03/15/2018     Date last seen by Dr. Wolf:  2018/    PCP: Samuel Kovacs MD    Impression:  1.  Penile ulceration  2.  Penile swelling due to previous cellulitis and history of trauma  3.  Slight post traumatic lymphatic damage to penile shaft.     Plan:  1. Type of swelling was reviewed in detail with the patient.  Questions were answered and education was completed.  2. After permission was obtained 2% Lidocaine HCL jelly was applied, under clean conditions, the distal penis post traumatic/cellulitis ulceration was debrided using currette.  Devitalized and non viable tissue, along with any fibrin and slough, was removed to improve granulation tissue formation, stimulate wound healing, decrease overall bacteria load, disrupt biofilm formation and decrease edge senescence.  Total excisional debridement was 10 sq cm from the epidermis/dermis area.   Ulcer was improved afterwards and .  Measures were as noted on the flow sheet and unchanged after debridement ..  3.  Wound treatment will include irrigation and dressings to promote autolytic debridement and will be:  tegaderm AG mesh then light alginate rope then stockinet/tubigrip to secure.  Change daily.  4. Patient will follow up in 1 week.  With his home situation and his ability to follow through on cares I would like to follow him a bit more frequently.    Time spent with patient 15 minutes with greater than 50% time in consultation, education and coordination of care.   ______________________________________________________________________      Chief Complaint: penile swelling and ulceration     History of Present Illness:     Landon Covarrubias returns to the  [facility] with penile swelling and ulceration.   He has been seen Dr. Salty Lazo in urology.  In September 2016 he developed penile injury from the catheter.  He kept pulling it out.  He has underlying schizophrenia.  Membranous urethral injuries were sustained.  By 2017 he was developing infection in the penile area.  He let it go and did not tell anybody about it.  By September 2017 the penis was very swollen and red with infection and ulceration.  He was seen by Dr. Lazo.  He was diagnosed with MRSA, group B strep and Enterobacter cloque.  He was switched from Bactrim to doxycycline.  He did better.  He was again seen January 26 of 2018 and the continued to be ventral distal penis bleeding with penile ulceration of the distal penile shaft.  He used therahoney with gauze, dry gauze, saline and gauze with minimal healing. The continued ulceration he was sent here.  Recent CT scan of the abdomen and pelvis on 12/1/2017 which was essentially negative showing a benign left midpole renal cyst.  The swelling resolved.  When I saw him I debrided the ulceration and started him on microcyn then tegaderm AG mesh then light alginate rope then stockinet/tubigrip to secure changing dressings three times a week.  Nurses report on undoing the dressing is it very wet.  There has been no new numbness, tingling or weakness.  There have been no new masses, rashes, or swellings of any other joints. There has been no new decrease in appetite, unexplained weight loss, abdominal bloating and bowel or bladder changes.  He states it is not painful.  Repeat culture results showed group B strep.  He was put on penicillin.  He is done with that prescription.      Past Medical History:   Diagnosis Date   ? Acute Renal Insufficiency     Created by Conversion    ? Acute Renal Insufficiency     Created by Conversion  Replacement Utility updated for latest IMO load   ? Altered mental state 6/23/2016   ? Cellulitis of corpus  cavernosum and penis 2018   ? Chronic kidney disease    ? Delirium 6/23/2016   ? Dementia praecox 5/16/2017   ? Disease of thyroid gland    ? Elevated blood pressure    ? Encephalopathy acute    ? Essential hypertension    ? Hematuria 6/24/2016   ? Hypercholesterolemia    ? Hyperlipidemia     Created by Conversion    ? Hypokalemia 7/5/2016   ? Hypothyroidism     Created by Conversion    ? Hypothyroidism     Created by Conversion  Replacement Utility updated for latest IMO load   ? Impaired fasting glucose     Created by Conversion    ? Lateral epicondylitis     Created by Conversion  Replacement Utility updated for latest IMO load   ? Lumbar spinal stenosis 6/8/2015   ? Mood disorder due to a general medical condition    ? 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 Other specified fever    ? Penile swelling    ? Retention of urine 7/16/2016   ? Schizophrenia     Created by Conversion  Replacement Utility updated for latest IMO load   ? Schizophrenia, chronic condition    ? Spondylolisthesis of lumbar region 6/13/2016   ? Systemic infection 7/5/2016   ? Undifferentiated schizophrenia    ? Urinary tract infection 7/5/2016         Current Outpatient Prescriptions:   ?  folic acid (FOLVITE) 400 MCG tablet, TAKE 1 TAB BY MOUTH ONCE DAILY FOR SUPPLEMENT, Disp: , Rfl: 11  ?  glucosamine sulfate 500 mg Tab, Take 1,500 mg by mouth., Disp: , Rfl:   ?  haloperidol (HALDOL) 5 MG tablet, Take 2.5 mg by mouth., Disp: , Rfl:   ?  levothyroxine (SYNTHROID, LEVOTHROID) 200 MCG tablet, TAKE 1 TAB BY MOUTH ONCE DAILY, Disp: , Rfl: 11  ?  lisinopril (PRINIVIL,ZESTRIL) 5 MG tablet, Take 5 mg by mouth daily., Disp: , Rfl: 11  ?  metoprolol succinate (TOPROL-XL) 25 MG, Take 25 mg by mouth., Disp: , Rfl:   ?  niacin 500 MG tablet, Take 500 mg by mouth daily with breakfast., Disp: , Rfl:   ?  OLANZapine (ZYPREXA) 20 MG tablet, Take 20 mg by mouth bedtime. , Disp: , Rfl:   ?  omeprazole (PRILOSEC) 20 MG capsule, Take 20  mg by mouth., Disp: , Rfl:   ?  perphenazine 8 MG tablet, Take 24 mg by mouth 2 (two) times a day., Disp: , Rfl:   ?  QUEtiapine (SEROQUEL) 50 MG tablet, Take 50 mg by mouth 3 (three) times a day as needed., Disp: , Rfl:     No Known Allergies    Social History     Social History   ? Marital status: Single     Spouse name: N/A   ? Number of children: 0   ? Years of education: N/A     Occupational History   ? retired      Social History Main Topics   ? Smoking status: Former Smoker     Packs/day: 1.00     Years: 20.00     Quit date: 2002   ? Smokeless tobacco: Never Used   ? Alcohol use No   ? Drug use: No   ? Sexual activity: No     Other Topics Concern   ? Not on file     Social History Narrative    Single.  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       Family History   Problem Relation Age of Onset   ? Parkinsonism Mother    ? Kidney disease Father    ? No Medical Problems Sister    ? Anesthesia problems Neg Hx        Review of Systems:  Review of systems is otherwise negative, except as noted above.  Full 12 point review of systems was completed.    Imaging:    No results found.    Labs:    No results found for: SEDRATE      No results found for: CRP        Lab Results   Component Value Date    CREATININE 1.09 10/20/2017      Lab Results   Component Value Date    HGBA1C 5.4 01/23/2017           Lab Results   Component Value Date    BUN 13 10/20/2017              Lab Results   Component Value Date    ALBUMIN 3.3 (L) 07/21/2017       Vitamin D, Total (25-Hydroxy)   Date Value Ref Range Status   01/23/2017 36.4 30.0 - 80.0 ng/mL Final       Lab Results   Component Value Date    TSH 3.99 04/21/2017     Lab Results   Component Value Date    WBC 7.7 07/21/2017    HGB 15.4 07/21/2017    HCT 45.0 07/21/2017    MCV 87 07/21/2017     07/21/2017     Culture/Gram Stain: Wound   Order: [id number]   Status:  Final result   Visible to patient:  No (Not Released) Next appt:  None Dx:  Chronic ulcer of corpus  cavernosum or...   Notes Recorded by Mira Wolf MD on 2/26/2018 at 3:38 PM  Please let the patient know that based on his wound culture I am starting him on penicillin.  He should take the full course.  It has been sent into his pharmacy.  I will see him at his regularly scheduled follow-up.   Culture   2+ Streptococcus group B (!)      1+ Coagulase negative Staphylococcus (!)      2+ Diphtheroids (!)      Stain   No polymorphonuclear leukocytes seen      No organisms seen            Resulting Agency: [facility]   Susceptibility    Streptococcus group B     DEVONTE     Ceftriaxone <=0.0625  Sensitive     Clindamycin 0.0625  Sensitive     Erythromycin 0.0625  Sensitive     Penicillin <=0.24298  Sensitive              Specimen Collected: 02/21/18  1:51 PM Last Resulted: 02/26/18  8:21 AM                Physical Exam:    Vitals:    03/15/18 1054   BP: 124/80   Pulse: 80   Resp: 18   Temp: 99  F (37.2  C)       General:  62 y.o. male in no apparent distress.  Alert and oriented x 3.  Cooperative.  Affect normal.     Genitalia:  Nearly circumferential ulceration of distal penis through dermis/epidermis with slight slough.  No odor. No faustino wound rubor or maceration.  Minimal pain to palpation.    There appears to be some increased epithelialization from the periphery of the ulcerations.  In addition, there is improved bleeding.  The size of the ulceration however is stable and has not apparently decreased.  Photo could not be taken due to malfunction of haiku.    Ulceration(s)/Wound(s):      No flowsheet data found.    Urethral Catheter Triple-lumen 22 Fr. (Active)       Wound 02/21/18 Penis (Active)   Pre Size Length 1.5 2/21/2018  1:00 PM   Pre Size Width 9 2/21/2018  1:00 PM   Pre Size Depth 0.1 2/21/2018  1:00 PM   Pre Total Sq cm 13.5 2/21/2018  1:00 PM   Undermined n 2/21/2018  1:00 PM   Tunneling n 2/21/2018  1:00 PM   Description red wound bed 2/21/2018  1:00 PM       Incision 06/08/15 Back Bilateral (Active)        Incision 06/23/16 Surgical Back (Active)                 2/21/18    Radiographic Studies:   Laboratory Studies:     Mira Wolf MD, ABWMS, FACCWS, [facility]  Medical Director Wound Care and Lymphedema  [facility]  [phone]

## 2021-06-26 NOTE — PROGRESS NOTES
Progress Notes by Mira Wolf MD at 10/11/2018 11:20 AM     Author: Mira Wolf MD Service: -- Author Type: Physician    Filed: 10/11/2018  1:52 PM Encounter Date: 10/11/2018 Status: Signed    : Mira Wolf MD (Physician)       Date of Service:  10/11/18    Date last seen by Dr. Wolf:  18    PCP: Samuel Kovacs MD    Impression:  1.  Penile ulceration-continuing to improve-near healed with very thin skin     Plan:  1. Questions were answered..  2.  Wound treatment will include irrigation and dressings to promote autolytic debridement and will be:  Xeroform then gauze then stockinet/tubigrip to secure.  Change daily or when moist.  3. Patient will follow up in 6 weeks.      Time spent with patient 10 minutes with greater than 50% time in consultation, education and coordination of care.   ______________________________________________________________________      Chief Complaint: penile swelling and ulceration     History of Present Illness:     Landon Covarrubias returns to the [facility] with penile ulceration since catheter injury.    He continues to get debridement with xeroform then light alginate rope then stockinet/tubigrip to secure with dressing changes daily.  He reports the ulceration continues to improve.    He has not had any odor.  There is no significant pain.  He has not had any significant discharge.  He has not had a fever.      Past Medical History:   Diagnosis Date   ? Acute Renal Insufficiency     Created by Conversion    ? Acute Renal Insufficiency     Created by Conversion  Replacement Utility updated for latest IMO load   ? Altered mental state 2016   ? Cellulitis of corpus cavernosum and penis    ? Chronic kidney disease    ? Delirium 2016   ? Dementia praecox (H) 2017   ? Disease of thyroid gland    ? Elevated blood pressure    ? Encephalopathy acute    ? Essential hypertension    ?  Hematuria 6/24/2016   ? Hypercholesterolemia    ? Hyperlipidemia     Created by Conversion    ? Hypokalemia 7/5/2016   ? Hypothyroidism     Created by Conversion    ? Hypothyroidism     Created by Conversion  Replacement Utility updated for latest IMO load   ? Impaired fasting glucose     Created by Conversion    ? Lateral epicondylitis     Created by Conversion  Replacement Utility updated for latest IMO load   ? Lumbar spinal stenosis 6/8/2015   ? Mood disorder due to a general medical condition    ? 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 Other specified fever    ? Penile swelling    ? Retention of urine 7/16/2016   ? Schizophrenia (H)     Created by Conversion  Replacement Utility updated for latest IMO load   ? Schizophrenia, chronic condition (H)    ? Spondylolisthesis of lumbar region 6/13/2016   ? Systemic infection (H) 7/5/2016   ? Undifferentiated schizophrenia (H)    ? Urinary tract infection 7/5/2016         Current Outpatient Prescriptions:   ?  folic acid (FOLVITE) 400 MCG tablet, TAKE 1 TAB BY MOUTH ONCE DAILY FOR SUPPLEMENT, Disp: , Rfl: 11  ?  glucosamine sulfate 500 mg Tab, Take 1,500 mg by mouth., Disp: , Rfl:   ?  haloperidol (HALDOL) 5 MG tablet, Take 2.5 mg by mouth., Disp: , Rfl:   ?  levothyroxine (SYNTHROID, LEVOTHROID) 200 MCG tablet, TAKE 1 TAB BY MOUTH ONCE DAILY, Disp: , Rfl: 11  ?  lisinopril (PRINIVIL,ZESTRIL) 5 MG tablet, Take 5 mg by mouth daily., Disp: , Rfl: 11  ?  metoprolol succinate (TOPROL-XL) 25 MG, Take 25 mg by mouth., Disp: , Rfl:   ?  niacin 500 MG tablet, Take 500 mg by mouth daily with breakfast., Disp: , Rfl:   ?  OLANZapine (ZYPREXA) 20 MG tablet, Take 20 mg by mouth bedtime. , Disp: , Rfl:   ?  omeprazole (PRILOSEC) 20 MG capsule, Take 20 mg by mouth., Disp: , Rfl:   ?  perphenazine 8 MG tablet, Take 24 mg by mouth 2 (two) times a day., Disp: , Rfl:   ?  QUEtiapine (SEROQUEL) 50 MG tablet, Take 50 mg by mouth 3 (three) times a day as needed.,  Disp: , Rfl:   ?  SENEXON-S 8.6-50 mg tablet, , Disp: , Rfl:     No Known Allergies    Social History     Social History   ? Marital status: Single     Spouse name: N/A   ? Number of children: 0   ? Years of education: N/A     Occupational History   ? retired      Social History Main Topics   ? Smoking status: Former Smoker     Packs/day: 1.00     Years: 20.00     Quit date: 2002   ? Smokeless tobacco: Never Used   ? Alcohol use No   ? Drug use: No   ? Sexual activity: No     Other Topics Concern   ? Not on file     Social History Narrative    Single.  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       Family History   Problem Relation Age of Onset   ? Parkinsonism Mother    ? Kidney disease Father    ? No Medical Problems Sister    ? Anesthesia problems Neg Hx        Review of Systems:  Review of systems is otherwise negative, except as noted above.  Full 12 point review of systems was completed.    Imaging:    I personally reviewed the following imaging today and those on care everywhere, if indicated    No results found.    Labs:    I personally reviewed the following labs today and those on care everywhere, if indicated    No results found for: SEDRATE      No results found for: CRP        Lab Results   Component Value Date    CREATININE 1.09 10/20/2017      Lab Results   Component Value Date    HGBA1C 5.4 01/23/2017           Lab Results   Component Value Date    BUN 13 10/20/2017              Lab Results   Component Value Date    ALBUMIN 3.3 (L) 07/21/2017       Vitamin D, Total (25-Hydroxy)   Date Value Ref Range Status   01/23/2017 36.4 30.0 - 80.0 ng/mL Final       Lab Results   Component Value Date    TSH 3.99 04/21/2017     Lab Results   Component Value Date    WBC 7.7 07/21/2017    HGB 15.4 07/21/2017    HCT 45.0 07/21/2017    MCV 87 07/21/2017     07/21/2017     Nothing new to review      Physical Exam:    Vitals:    10/11/18 1152   BP: 102/74   Pulse: 88   Resp: 17   Temp: 98.9  F (37.2   C)     General:  62 y.o. male in no apparent distress.    Psych: Alert and oriented x 3.  Cooperative.  Affect normal.     Genitalia:   The penile ulceration has now closed.  There is a very thin layer of healed epidermal tissue along the left side of the ulceration.  There is no significant maceration.  There is no odor or discharge.  There is no redness or pain to palpation.      Ulceration(s)/Wound(s):     Urethral Catheter Triple-lumen 22 Fr. (Active)       Wound 02/21/18 Penis (Active)   Pre Size Length 0.4 8/30/2018 11:00 AM   Pre Size Width 1.8 8/30/2018 11:00 AM   Pre Size Depth 0 8/30/2018 11:00 AM   Pre Total Sq cm 0.72 8/30/2018 11:00 AM   Post Size Length 0.6 8/30/2018 11:00 AM   Post Size Width 0.3 8/30/2018 11:00 AM   Post Size Depth 0.01 8/30/2018 11:00 AM   Post Total Sq cm 0.18 8/30/2018 11:00 AM   Undermined n 5/17/2018 12:00 PM   Tunneling n 5/17/2018 12:00 PM   Description healed 10/11/2018 11:00 AM   Prodcut Used Alginate 5/17/2018 12:00 PM       Incision 06/08/15 Back Bilateral (Active)       Incision 06/23/16 Surgical Back (Active)                                  2/21/18                          3/26/18 left side            3/26/18 right side                  4/16/18 left side                  5/17/18    Left side                     7/11/18 8/30/28       Mira Wolf MD, ABWMS, FACCWS, [facility]  Medical Director Wound Care and Lymphedema  [facility]  [phone]

## 2021-06-26 NOTE — PROGRESS NOTES
Progress Notes by Mira Wolf MD at 4/16/2018 12:40 PM     Author: Mira Wolf MD Service: -- Author Type: Physician    Filed: 4/16/2018  1:04 PM Encounter Date: 4/16/2018 Status: Signed    : Mira Wolf MD (Physician)       Date of Service:  04/16/18    Date last seen by Dr. Wolf:  3/26/2018    PCP: Samuel Kovacs MD    Impression:  1.  Penile ulceration-improving  2.  Penile swelling due to previous cellulitis and history of trauma  3.  Slight post traumatic lymphatic damage to penile shaft.     Plan:  1. Questions were answered..  2. After permission was obtained 2% Lidocaine HCL jelly was applied, under clean conditions, the distal penis post traumatic/cellulitis ulceration was debrided using currette.  Devitalized and non viable tissue, along with any fibrin and slough, was removed to improve granulation tissue formation, stimulate wound healing, decrease overall bacteria load, disrupt biofilm formation and decrease edge senescence.  Total excisional debridement was 4 sq cm from the epidermis/dermis area.   Ulcer was improved afterwards and .  Measures were as noted on the flow sheet and unchanged after debridement .  3.  Wound treatment will include irrigation and dressings to promote autolytic debridement and will continue to be:  tegaderm AG mesh then light alginate rope then stockinet/tubigrip to secure.  Change daily or when moist.  4. Patient will follow up in 3 weeks.      Time spent with patient 15 minutes with greater than 50% time in consultation, education and coordination of care.   ______________________________________________________________________      Chief Complaint: penile swelling and ulceration     History of Present Illness:     Landon Covarrubias returns to the [facility] with penile swelling and ulceration.   He has been seen Dr. Salty Lazo in urology.  In September 2016 he developed penile injury from the catheter.   He kept pulling it out.  He has underlying schizophrenia.  Membranous urethral injuries were sustained.  By 2017 he was developing infection in the penile area.  He let it go and did not tell anybody about it.  By September 2017 the penis was very swollen and red with infection and ulceration.  He was seen by Dr. Laoz.  He was diagnosed with MRSA, group B strep and Enterobacter cloque.  He was switched from Bactrim to doxycycline.  He did better.  He was again seen January 26 of 2018 and the continued to be ventral distal penis bleeding with penile ulceration of the distal penile shaft.  He used therahoney with gauze, dry gauze, saline and gauze with minimal healing. The continued ulceration he was sent here.  CT scan of the abdomen and pelvis on 12/1/2017 which was essentially negative showing a benign left midpole renal cyst.  The swelling resolved.  When I first saw him I debrided the ulceration and started him on microcyn then tegaderm AG mesh then light alginate rope then stockinet/tubigrip to secure changing dressings three times a week.  Nurses reported the dressings being very wet. He was debrided again and changed to tegaderm AG mesh then light alginate rope then stockinet/tubigrip to secure.  Change daily.  He is here after one week.  He still gets drainage and has had to change the dressings more frequently. He states it is not painful.  He feels it is healing and drainage is overall less.       Past Medical History:   Diagnosis Date   ? Acute Renal Insufficiency     Created by Conversion    ? Acute Renal Insufficiency     Created by Conversion  Replacement Utility updated for latest IMO load   ? Altered mental state 6/23/2016   ? Cellulitis of corpus cavernosum and penis 2018   ? Chronic kidney disease    ? Delirium 6/23/2016   ? Dementia praecox 5/16/2017   ? Disease of thyroid gland    ? Elevated blood pressure    ? Encephalopathy acute    ? Essential hypertension    ? Hematuria 6/24/2016   ?  Hypercholesterolemia    ? Hyperlipidemia     Created by Conversion    ? Hypokalemia 7/5/2016   ? Hypothyroidism     Created by Conversion    ? Hypothyroidism     Created by Conversion  Replacement Utility updated for latest IMO load   ? Impaired fasting glucose     Created by Conversion    ? Lateral epicondylitis     Created by Conversion  Replacement Utility updated for latest IMO load   ? Lumbar spinal stenosis 6/8/2015   ? Mood disorder due to a general medical condition    ? 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 Other specified fever    ? Penile swelling    ? Retention of urine 7/16/2016   ? Schizophrenia     Created by Conversion  Replacement Utility updated for latest IMO load   ? Schizophrenia, chronic condition    ? Spondylolisthesis of lumbar region 6/13/2016   ? Systemic infection 7/5/2016   ? Undifferentiated schizophrenia    ? Urinary tract infection 7/5/2016         Current Outpatient Prescriptions:   ?  folic acid (FOLVITE) 400 MCG tablet, TAKE 1 TAB BY MOUTH ONCE DAILY FOR SUPPLEMENT, Disp: , Rfl: 11  ?  glucosamine sulfate 500 mg Tab, Take 1,500 mg by mouth., Disp: , Rfl:   ?  haloperidol (HALDOL) 5 MG tablet, Take 2.5 mg by mouth., Disp: , Rfl:   ?  levothyroxine (SYNTHROID, LEVOTHROID) 200 MCG tablet, TAKE 1 TAB BY MOUTH ONCE DAILY, Disp: , Rfl: 11  ?  lisinopril (PRINIVIL,ZESTRIL) 5 MG tablet, Take 5 mg by mouth daily., Disp: , Rfl: 11  ?  metoprolol succinate (TOPROL-XL) 25 MG, Take 25 mg by mouth., Disp: , Rfl:   ?  niacin 500 MG tablet, Take 500 mg by mouth daily with breakfast., Disp: , Rfl:   ?  OLANZapine (ZYPREXA) 20 MG tablet, Take 20 mg by mouth bedtime. , Disp: , Rfl:   ?  omeprazole (PRILOSEC) 20 MG capsule, Take 20 mg by mouth., Disp: , Rfl:   ?  perphenazine 8 MG tablet, Take 24 mg by mouth 2 (two) times a day., Disp: , Rfl:   ?  QUEtiapine (SEROQUEL) 50 MG tablet, Take 50 mg by mouth 3 (three) times a day as needed., Disp: , Rfl:     No Known  Allergies    Social History     Social History   ? Marital status: Single     Spouse name: N/A   ? Number of children: 0   ? Years of education: N/A     Occupational History   ? retired      Social History Main Topics   ? Smoking status: Former Smoker     Packs/day: 1.00     Years: 20.00     Quit date: 2002   ? Smokeless tobacco: Never Used   ? Alcohol use No   ? Drug use: No   ? Sexual activity: No     Other Topics Concern   ? Not on file     Social History Narrative    Single.  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       Family History   Problem Relation Age of Onset   ? Parkinsonism Mother    ? Kidney disease Father    ? No Medical Problems Sister    ? Anesthesia problems Neg Hx        Review of Systems:  Review of systems is otherwise negative, except as noted above.  Full 12 point review of systems was completed.    Imaging:    No results found.    Labs:    No results found for: SEDRATE      No results found for: CRP        Lab Results   Component Value Date    CREATININE 1.09 10/20/2017      Lab Results   Component Value Date    HGBA1C 5.4 01/23/2017           Lab Results   Component Value Date    BUN 13 10/20/2017              Lab Results   Component Value Date    ALBUMIN 3.3 (L) 07/21/2017       Vitamin D, Total (25-Hydroxy)   Date Value Ref Range Status   01/23/2017 36.4 30.0 - 80.0 ng/mL Final       Lab Results   Component Value Date    TSH 3.99 04/21/2017     Lab Results   Component Value Date    WBC 7.7 07/21/2017    HGB 15.4 07/21/2017    HCT 45.0 07/21/2017    MCV 87 07/21/2017     07/21/2017     Culture/Gram Stain: Wound   Order: [id number]   Status:  Final result   Visible to patient:  No (Not Released) Next appt:  None Dx:  Chronic ulcer of corpus cavernosum or...   Notes Recorded by Mira Wolf MD on 2/26/2018 at 3:38 PM  Please let the patient know that based on his wound culture I am starting him on penicillin.  He should take the full course.  It has been sent into  his pharmacy.  I will see him at his regularly scheduled follow-up.   Culture   2+ Streptococcus group B (!)      1+ Coagulase negative Staphylococcus (!)      2+ Diphtheroids (!)      Stain   No polymorphonuclear leukocytes seen      No organisms seen            Resulting Agency: [facility]   Susceptibility    Streptococcus group B     DEVONTE     Ceftriaxone <=0.0625  Sensitive     Clindamycin 0.0625  Sensitive     Erythromycin 0.0625  Sensitive     Penicillin <=0.24485  Sensitive              Specimen Collected: 02/21/18  1:51 PM Last Resulted: 02/26/18  8:21 AM                Physical Exam:    Vitals:    04/16/18 1236   BP: 110/78   Pulse: 88   Resp: 20   Temp: 98.8  F (37.1  C)       General:  62 y.o. male in no apparent distress.  Alert and oriented x 3.  Cooperative.  Affect normal.     Genitalia:  Nearly circumferential ulceration of distal penis through dermis/epidermis with slight slough has gotten significantly smaller, especially on the right side.    No odor. No faustino wound rubor or maceration.  No pain to palpation. Increased epithelialization from the periphery of the ulcerations.  See photos and measures.      Ulceration(s)/Wound(s):     Urethral Catheter Triple-lumen 22 Fr. (Active)       Wound 02/21/18 Penis (Active)   Pre Size Length 0.7 4/16/2018 12:00 PM   Pre Size Width 6 4/16/2018 12:00 PM   Pre Size Depth 0.1 4/16/2018 12:00 PM   Pre Total Sq cm 3.6 4/16/2018 12:00 PM   Undermined n 4/16/2018 12:00 PM   Tunneling n 4/16/2018 12:00 PM   Description red wound  4/16/2018 12:00 PM   Prodcut Used Gauze 4/16/2018 12:00 PM       Incision 06/08/15 Back Bilateral (Active)       Incision 06/23/16 Surgical Back (Active)                         2/21/18                          3/26/18 left side            3/26/18 right side                  4/16/18 left side                  4/1/18 right side    Mira Wolf MD, ABWMS, FACCWS, [facility]  Medical Director Wound Care and Lymphedema  [facility]  [place]  [phone]

## 2021-06-26 NOTE — PATIENT INSTRUCTIONS - HE
"1. Continue medications as prescribed  2. Have your pharmacy contact us for a refill if you are running low on medications (We may ask you to come into clinic to get a refill from the nurse)  3. No alcohol or drug use  4. No driving if sedated  5. Contact the clinic with any questions or concerns   a. Phone: [phone]  b. Fax: [phone]  6. Reach out for help if you feel like hurting yourself or others:   a. [facility]: [address] [place] [address], [address] (phone: [phone])  b. [facility] Suicide Hotline: 919.989.8691   c. Crisis Texting Line: Text \"[place]\" to [phone]  d. Call 911 or go to nearest Emergency room   7. Follow up as directed with new psychiatric care provider for your appointment. Please call the [facility] to schedule ([phone])    "

## 2021-06-26 NOTE — PROGRESS NOTES
Progress Notes by Mira Wolf MD at 2018 11:20 AM     Author: Mira Wolf MD Service: -- Author Type: Physician    Filed: 2018 12:49 PM Encounter Date: 2018 Status: Signed    : Mira Wolf MD (Physician)       Date of Service:  18    Date last seen by Dr. Wolf:  18    PCP: Samuel Kovacs MD    Impression:  1.  Penile ulceration-continuing to improve-near healed  2.  Slight post traumatic lymphatic damage to penile shaft-near resolved     Plan:  1. Questions were answered..  2. After permission was obtained 2% Lidocaine HCL jelly was applied, under clean conditions, the distal penis post traumatic/cellulitis ulceration was debrided using currette.  Devitalized and non viable tissue, along with any fibrin and slough, was removed to improve granulation tissue formation, stimulate wound healing, decrease overall bacteria load, disrupt biofilm formation and decrease edge senescence.  Total excisional debridement was 0.18 sq cm from the epidermis/dermis area.   Ulcer was improved afterwards and .  Measures were as noted on the flow sheet and unchanged after debridement .  3.  Wound treatment will include irrigation and dressings to promote autolytic debridement and will be:  Xeroform then light alginate rope then stockinet/tubigrip to secure.  Change daily or when moist.  4. Patient will follow up in 6 weeks.      Time spent with patient 15 minutes with greater than 50% time in consultation, education and coordination of care.   ______________________________________________________________________      Chief Complaint: penile swelling and ulceration     History of Present Illness:     Landon Covarrubias returns to the [facility] with penile ulceration since catheter injury.    He continues to get debridement with Tegaderm AG mesh then light alginate rope then stockinet/tubigrip to secure with dressing  changes daily.  He reports the ulceration continues to improve.  It was near healed, but has started to bleed slightly again and the dressing is sticking.  He has not had any odor.  There is no significant pain.  He has not had any significant discharge.  He has not had a fever.      Past Medical History:   Diagnosis Date   ? Acute Renal Insufficiency     Created by Conversion    ? Acute Renal Insufficiency     Created by Conversion  Replacement Utility updated for latest IMO load   ? Altered mental state 6/23/2016   ? Cellulitis of corpus cavernosum and penis 2018   ? Chronic kidney disease    ? Delirium 6/23/2016   ? Dementia praecox (H) 5/16/2017   ? Disease of thyroid gland    ? Elevated blood pressure    ? Encephalopathy acute    ? Essential hypertension    ? Hematuria 6/24/2016   ? Hypercholesterolemia    ? Hyperlipidemia     Created by Conversion    ? Hypokalemia 7/5/2016   ? Hypothyroidism     Created by Conversion    ? Hypothyroidism     Created by Conversion  Replacement Utility updated for latest IMO load   ? Impaired fasting glucose     Created by Conversion    ? Lateral epicondylitis     Created by Conversion  Replacement Utility updated for latest IMO load   ? Lumbar spinal stenosis 6/8/2015   ? Mood disorder due to a general medical condition    ? 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 Other specified fever    ? Penile swelling    ? Retention of urine 7/16/2016   ? Schizophrenia (H)     Created by Conversion  Replacement Utility updated for latest IMO load   ? Schizophrenia, chronic condition (H)    ? Spondylolisthesis of lumbar region 6/13/2016   ? Systemic infection (H) 7/5/2016   ? Undifferentiated schizophrenia (H)    ? Urinary tract infection 7/5/2016         Current Outpatient Prescriptions:   ?  folic acid (FOLVITE) 400 MCG tablet, TAKE 1 TAB BY MOUTH ONCE DAILY FOR SUPPLEMENT, Disp: , Rfl: 11  ?  glucosamine sulfate 500 mg Tab, Take 1,500 mg by mouth., Disp: , Rfl:   ?   haloperidol (HALDOL) 5 MG tablet, Take 2.5 mg by mouth., Disp: , Rfl:   ?  levothyroxine (SYNTHROID, LEVOTHROID) 200 MCG tablet, TAKE 1 TAB BY MOUTH ONCE DAILY, Disp: , Rfl: 11  ?  lisinopril (PRINIVIL,ZESTRIL) 5 MG tablet, Take 5 mg by mouth daily., Disp: , Rfl: 11  ?  metoprolol succinate (TOPROL-XL) 25 MG, Take 25 mg by mouth., Disp: , Rfl:   ?  niacin 500 MG tablet, Take 500 mg by mouth daily with breakfast., Disp: , Rfl:   ?  OLANZapine (ZYPREXA) 20 MG tablet, Take 20 mg by mouth bedtime. , Disp: , Rfl:   ?  omeprazole (PRILOSEC) 20 MG capsule, Take 20 mg by mouth., Disp: , Rfl:   ?  perphenazine 8 MG tablet, Take 24 mg by mouth 2 (two) times a day., Disp: , Rfl:   ?  QUEtiapine (SEROQUEL) 50 MG tablet, Take 50 mg by mouth 3 (three) times a day as needed., Disp: , Rfl:   ?  SENEXON-S 8.6-50 mg tablet, , Disp: , Rfl:     No Known Allergies    Social History     Social History   ? Marital status: Single     Spouse name: N/A   ? Number of children: 0   ? Years of education: N/A     Occupational History   ? retired      Social History Main Topics   ? Smoking status: Former Smoker     Packs/day: 1.00     Years: 20.00     Quit date: 2002   ? Smokeless tobacco: Never Used   ? Alcohol use No   ? Drug use: No   ? Sexual activity: No     Other Topics Concern   ? Not on file     Social History Narrative    Single.  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       Family History   Problem Relation Age of Onset   ? Parkinsonism Mother    ? Kidney disease Father    ? No Medical Problems Sister    ? Anesthesia problems Neg Hx        Review of Systems:  Review of systems is otherwise negative, except as noted above.  Full 12 point review of systems was completed.    Imaging:    I personally reviewed the following imaging today and those on care everywhere, if indicated    No results found.    Labs:    I personally reviewed the following labs today and those on care everywhere, if indicated    No results found for:  SEDRATE      No results found for: CRP        Lab Results   Component Value Date    CREATININE 1.09 10/20/2017      Lab Results   Component Value Date    HGBA1C 5.4 01/23/2017           Lab Results   Component Value Date    BUN 13 10/20/2017              Lab Results   Component Value Date    ALBUMIN 3.3 (L) 07/21/2017       Vitamin D, Total (25-Hydroxy)   Date Value Ref Range Status   01/23/2017 36.4 30.0 - 80.0 ng/mL Final       Lab Results   Component Value Date    TSH 3.99 04/21/2017     Lab Results   Component Value Date    WBC 7.7 07/21/2017    HGB 15.4 07/21/2017    HCT 45.0 07/21/2017    MCV 87 07/21/2017     07/21/2017     Nothing new to review      Physical Exam:    Vitals:    08/30/18 1135   BP: 118/82   Pulse: 80   Resp: 16   Temp: 98.7  F (37.1  C)     General:  62 y.o. male in no apparent distress.    Psych: Alert and oriented x 3.  Cooperative.  Affect normal.     Genitalia:   The right side continues to be healed.  The left side is continuing to improve and now measures 0.6 X 0.3 cm and 0.01 cm depth.  Epithelialization continues and there is just slight slough.  No odor. No faustino wound rubor or maceration.  No pain to palpation.  See photos and measures.      Ulceration(s)/Wound(s):     Urethral Catheter Triple-lumen 22 Fr. (Active)       Wound 02/21/18 Penis (Active)   Pre Size Length 0.4 8/30/2018 11:00 AM   Pre Size Width 1.8 8/30/2018 11:00 AM   Pre Size Depth 0 8/30/2018 11:00 AM   Pre Total Sq cm 0.72 8/30/2018 11:00 AM   Post Size Length 0.6 8/30/2018 11:00 AM   Post Size Width 0.3 8/30/2018 11:00 AM   Post Size Depth 0.01 8/30/2018 11:00 AM   Post Total Sq cm 0.18 8/30/2018 11:00 AM   Undermined n 5/17/2018 12:00 PM   Tunneling n 5/17/2018 12:00 PM   Description red wound bed 5/17/2018 12:00 PM   Prodcut Used Alginate 5/17/2018 12:00 PM       Incision 06/08/15 Back Bilateral (Active)       Incision 06/23/16 Surgical Back (Active)                                  2/21/18                           3/26/18 left side            3/26/18 right side                  4/16/18 left side                  5/17/18    Left side                     7/11/18 8/30/28       Mira Wolf MD, ABWMS, FACCWS, [facility]  Medical Director Wound Care and Lymphedema  [facility]  [phone]

## 2021-06-26 NOTE — PROGRESS NOTES
Progress Notes by Mira Wolf MD at 2018 12:40 PM     Author: Mira Wolf MD Service: -- Author Type: Physician    Filed: 2018  1:29 PM Encounter Date: 2018 Status: Signed    : Mira Wolf MD (Physician)       Date of Service:  18    Date last seen by Dr. Wolf:  18    PCP: Samuel Kovacs MD    Impression:  1.  Penile ulceration-improving very nicely  2.  Penile swelling due to previous cellulitis and history of trauma  3.  Slight post traumatic lymphatic damage to penile shaft.     Plan:  1. Questions were answered..  2. After permission was obtained 2% Lidocaine HCL jelly was applied, under clean conditions, the distal penis post traumatic/cellulitis ulceration was debrided using currette.  Devitalized and non viable tissue, along with any fibrin and slough, was removed to improve granulation tissue formation, stimulate wound healing, decrease overall bacteria load, disrupt biofilm formation and decrease edge senescence.  Total excisional debridement was 1 sq cm from the epidermis/dermis area.   Ulcer was improved afterwards and .  Measures were as noted on the flow sheet and unchanged after debridement .  3.  Wound treatment will include irrigation and dressings to promote autolytic debridement and will continue to be:  tegaderm AG mesh then light alginate rope then stockinet/tubigrip to secure.  Change daily or when moist.  4. Patient will follow up in 4 weeks.      Time spent with patient 15 minutes with greater than 50% time in consultation, education and coordination of care.   ______________________________________________________________________      Chief Complaint: penile swelling and ulceration     History of Present Illness:     [facility].  When I first saw him I debrided the ulceration  and started him on microcyn then tegaderm AG mesh then light alginate rope then stockinet/tubigrip to secure changing dressings three times a week.  Nurses reported the dressings being very wet.  He was debrided again and changed to tegaderm AG mesh then light alginate rope then stockinet/tubigrip to secure.  Change daily.  Been improving.  Debridement was continued with the same dressings.  He is here for his follow-up.  He reports things are much better.  The right side of the ulcer has almost totally healed.  The left side is improving nicely.  He has not had any odor.  There is no significant pain.  He has not had any significant discharge.  He has not had a fever.      Past Medical History:   Diagnosis Date   ? Acute Renal Insufficiency     Created by Conversion    ? Acute Renal Insufficiency     Created by Conversion  Replacement Utility updated for latest IMO load   ? Altered mental state 6/23/2016   ? Cellulitis of corpus cavernosum and penis 2018   ? Chronic kidney disease    ? Delirium 6/23/2016   ? Dementia praecox (H) 5/16/2017   ? Disease of thyroid gland    ? Elevated blood pressure    ? Encephalopathy acute    ? Essential hypertension    ? Hematuria 6/24/2016   ? Hypercholesterolemia    ? Hyperlipidemia     Created by Conversion    ? Hypokalemia 7/5/2016   ? Hypothyroidism     Created by Conversion    ? Hypothyroidism     Created by Conversion  Replacement Utility updated for latest IMO load   ? Impaired fasting glucose     Created by Conversion    ? Lateral epicondylitis     Created by Conversion  Replacement Utility updated for latest IMO load   ? Lumbar spinal stenosis 6/8/2015   ? Mood disorder due to a general medical condition    ? 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 Other specified fever    ? Penile swelling    ? Retention of urine 7/16/2016   ? Schizophrenia (H)     Created by Conversion  Replacement Utility updated for latest IMO load   ? Schizophrenia, chronic  condition (H)    ? Spondylolisthesis of lumbar region 6/13/2016   ? Systemic infection (H) 7/5/2016   ? Undifferentiated schizophrenia (H)    ? Urinary tract infection 7/5/2016         Current Outpatient Prescriptions:   ?  folic acid (FOLVITE) 400 MCG tablet, TAKE 1 TAB BY MOUTH ONCE DAILY FOR SUPPLEMENT, Disp: , Rfl: 11  ?  glucosamine sulfate 500 mg Tab, Take 1,500 mg by mouth., Disp: , Rfl:   ?  haloperidol (HALDOL) 5 MG tablet, Take 2.5 mg by mouth., Disp: , Rfl:   ?  levothyroxine (SYNTHROID, LEVOTHROID) 200 MCG tablet, TAKE 1 TAB BY MOUTH ONCE DAILY, Disp: , Rfl: 11  ?  lisinopril (PRINIVIL,ZESTRIL) 5 MG tablet, Take 5 mg by mouth daily., Disp: , Rfl: 11  ?  metoprolol succinate (TOPROL-XL) 25 MG, Take 25 mg by mouth., Disp: , Rfl:   ?  niacin 500 MG tablet, Take 500 mg by mouth daily with breakfast., Disp: , Rfl:   ?  OLANZapine (ZYPREXA) 20 MG tablet, Take 20 mg by mouth bedtime. , Disp: , Rfl:   ?  omeprazole (PRILOSEC) 20 MG capsule, Take 20 mg by mouth., Disp: , Rfl:   ?  perphenazine 8 MG tablet, Take 24 mg by mouth 2 (two) times a day., Disp: , Rfl:   ?  QUEtiapine (SEROQUEL) 50 MG tablet, Take 50 mg by mouth 3 (three) times a day as needed., Disp: , Rfl:     No Known Allergies    Social History     Social History   ? Marital status: Single     Spouse name: N/A   ? Number of children: 0   ? Years of education: N/A     Occupational History   ? retired      Social History Main Topics   ? Smoking status: Former Smoker     Packs/day: 1.00     Years: 20.00     Quit date: 2002   ? Smokeless tobacco: Never Used   ? Alcohol use No   ? Drug use: No   ? Sexual activity: No     Other Topics Concern   ? Not on file     Social History Narrative    Single.  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       Family History   Problem Relation Age of Onset   ? Parkinsonism Mother    ? Kidney disease Father    ? No Medical Problems Sister    ? Anesthesia problems Neg Hx        Review of Systems:  Review of  systems is otherwise negative, except as noted above.  Full 12 point review of systems was completed.    Imaging:    No results found.    Labs:    No results found for: SEDRATE      No results found for: CRP        Lab Results   Component Value Date    CREATININE 1.09 10/20/2017      Lab Results   Component Value Date    HGBA1C 5.4 01/23/2017           Lab Results   Component Value Date    BUN 13 10/20/2017              Lab Results   Component Value Date    ALBUMIN 3.3 (L) 07/21/2017       Vitamin D, Total (25-Hydroxy)   Date Value Ref Range Status   01/23/2017 36.4 30.0 - 80.0 ng/mL Final       Lab Results   Component Value Date    TSH 3.99 04/21/2017     Lab Results   Component Value Date    WBC 7.7 07/21/2017    HGB 15.4 07/21/2017    HCT 45.0 07/21/2017    MCV 87 07/21/2017     07/21/2017     Culture/Gram Stain: Wound   Order: [id number]   Status:  Final result   Visible to patient:  No (Not Released) Next appt:  None Dx:  Chronic ulcer of corpus cavernosum or...   Notes Recorded by Mira Wolf MD on 2/26/2018 at 3:38 PM  Please let the patient know that based on his wound culture I am starting him on penicillin.  He should take the full course.  It has been sent into his pharmacy.  I will see him at his regularly scheduled follow-up.   Culture   2+ Streptococcus group B (!)      1+ Coagulase negative Staphylococcus (!)      2+ Diphtheroids (!)      Stain   No polymorphonuclear leukocytes seen      No organisms seen            Resulting Agency: [facility]   Susceptibility    Streptococcus group B     DEVONTE     Ceftriaxone <=0.0625  Sensitive     Clindamycin 0.0625  Sensitive     Erythromycin 0.0625  Sensitive     Penicillin <=0.69482  Sensitive              Specimen Collected: 02/21/18  1:51 PM Last Resulted: 02/26/18  8:21 AM                Physical Exam:    Vitals:    06/13/18 1239   BP: 129/70   Pulse: 75   Resp: 14   Temp: 98.8  F (37.1  C)   SpO2: 98%       General:  62 y.o. male in no apparent  distress.  Alert and oriented x 3.  Cooperative.  Affect normal.     Genitalia:  Nearly circumferential ulceration of distal penis through dermis/epidermis with slight slough has gotten significantly smaller.  Right side is closed.  The left side now measures 2.2 X 0.8 cm.  Improved epithelization.  Some slough.  No odor. No faustino wound rubor or maceration.  No pain to palpation.  See photos and measures.      Ulceration(s)/Wound(s):     Urethral Catheter Triple-lumen 22 Fr. (Active)       Wound 02/21/18 Penis (Active)   Pre Size Length 0.5 6/13/2018 12:00 PM   Pre Size Width 1 6/13/2018 12:00 PM   Pre Size Depth 0.1 6/13/2018 12:00 PM   Pre Total Sq cm 1 6/13/2018 12:00 PM   Post Size Length 0.8 6/13/2018 12:00 PM   Post Size Width 2.2 6/13/2018 12:00 PM   Post Size Depth 0.01 6/13/2018 12:00 PM   Post Total Sq cm 1.76 6/13/2018 12:00 PM   Undermined n 5/17/2018 12:00 PM   Tunneling n 5/17/2018 12:00 PM   Description red wound bed 5/17/2018 12:00 PM   Prodcut Used Alginate 5/17/2018 12:00 PM       Incision 06/08/15 Back Bilateral (Active)       Incision 06/23/16 Surgical Back (Active)                                       2/21/18                          3/26/18 left side            3/26/18 right side                  4/16/18 left side                  4/1/18 right side            5/17/18 R side                          Left side                  6/13/18 Left       Mira Wolf MD, ABWMS, FACCWS, [facility]  Medical Director Wound Care and Lymphedema  [facility]  [phone]

## 2021-07-07 ENCOUNTER — RECORDS - HEALTHEAST (OUTPATIENT)
Dept: ADMINISTRATIVE | Facility: OTHER | Age: 66
End: 2021-07-07

## 2021-08-24 DIAGNOSIS — R33.9 RETENTION OF URINE: ICD-10-CM

## 2021-08-24 RX ORDER — TAMSULOSIN HYDROCHLORIDE 0.4 MG/1
CAPSULE ORAL
Qty: 30 CAPSULE | Refills: 10 | Status: SHIPPED | OUTPATIENT
Start: 2021-08-24 | End: 2022-08-17

## 2021-09-30 DIAGNOSIS — E78.00 HYPERCHOLESTEROLEMIA: ICD-10-CM

## 2021-09-30 DIAGNOSIS — I10 ESSENTIAL HYPERTENSION: ICD-10-CM

## 2021-10-01 RX ORDER — METOPROLOL SUCCINATE 25 MG/1
TABLET, EXTENDED RELEASE ORAL
Qty: 90 TABLET | Refills: 0 | Status: SHIPPED | OUTPATIENT
Start: 2021-10-01 | End: 2021-12-25

## 2021-10-01 RX ORDER — SIMVASTATIN 20 MG
TABLET ORAL
Qty: 90 TABLET | Refills: 0 | Status: SHIPPED | OUTPATIENT
Start: 2021-10-01 | End: 2021-12-25

## 2021-10-01 RX ORDER — LISINOPRIL 5 MG/1
TABLET ORAL
Qty: 30 TABLET | Refills: 10 | Status: SHIPPED | OUTPATIENT
Start: 2021-10-01 | End: 2022-09-06

## 2021-10-01 NOTE — TELEPHONE ENCOUNTER
"Zocor:   Last Written Prescription Date:  10/23/2020  Last Fill Quantity: 30,  # refills: 10   Last office visit provider:  2020      Metoprolol:   Last Written Prescription Date:  2020  Last Fill Quantity: 30,  # refills: 10   Last office visit provider:  2020      Requested Prescriptions   Pending Prescriptions Disp Refills     simvastatin (ZOCOR) 20 MG tablet [Pharmacy Med Name: SIMVASTATIN F/C 20MG TABLET] 30 tablet 10     Si TAB BY MOUTH EVERY EVENING (DX: HYPERLIPIDEMIA)       Statins Protocol Passed - 2021  9:00 AM        Passed - LDL on file in past 12 months     Recent Labs   Lab Test 20  0929   LDL 94             Passed - No abnormal creatine kinase in past 12 months     No lab results found.             Passed - Recent (12 mo) or future (30 days) visit within the authorizing provider's specialty     Patient has had an office visit with the authorizing provider or a provider within the authorizing providers department within the previous 12 mos or has a future within next 30 days. See \"Patient Info\" tab in inbasket, or \"Choose Columns\" in Meds & Orders section of the refill encounter.              Passed - Medication is active on med list        Passed - Patient is age 18 or older           lisinopril (ZESTRIL) 5 MG tablet [Pharmacy Med Name: LISINOPRIL 5MG TABLET] 30 tablet 10     Si TAB BY MOUTH DAILY (DX: HYPERTENSION)       ACE Inhibitors (Including Combos) Protocol Failed - 2021  9:00 AM        Failed - Normal serum potassium on file in past 12 months     Recent Labs   Lab Test 20  0929   POTASSIUM 5.2*             Passed - Blood pressure under 140/90 in past 12 months     BP Readings from Last 3 Encounters:   21 133/79   20 130/80                 Passed - Recent (12 mo) or future (30 days) visit within the authorizing provider's specialty     Patient has had an office visit with the authorizing provider or a provider within the authorizing " "providers department within the previous 12 mos or has a future within next 30 days. See \"Patient Info\" tab in inbasket, or \"Choose Columns\" in Meds & Orders section of the refill encounter.              Passed - Medication is active on med list        Passed - Patient is age 18 or older        Passed - Normal serum creatinine on file in past 12 months     Recent Labs   Lab Test 20  0929   CR 1.28       Ok to refill medication if creatinine is low             metoprolol succinate ER (TOPROL-XL) 25 MG 24 hr tablet [Pharmacy Med Name: METOPROLOL SUCC ER 25MG TAB ER 24H] 30 tablet 10     Si TAB BY MOUTH DAILY (DX: HYPERTENSION)       Beta-Blockers Protocol Passed - 2021  9:00 AM        Passed - Blood pressure under 140/90 in past 12 months     BP Readings from Last 3 Encounters:   21 133/79   20 130/80                 Passed - Patient is age 6 or older        Passed - Recent (12 mo) or future (30 days) visit within the authorizing provider's specialty     Patient has had an office visit with the authorizing provider or a provider within the authorizing providers department within the previous 12 mos or has a future within next 30 days. See \"Patient Info\" tab in inbasket, or \"Choose Columns\" in Meds & Orders section of the refill encounter.              Passed - Medication is active on med list             Jesus Lewis RN 10/01/21 10:24 AM  "

## 2021-10-01 NOTE — TELEPHONE ENCOUNTER
"Routing refill request to provider for review/approval because:  Labs out of range:  K.     Lisinopril:   Last Written Prescription Date:  2020  Last Fill Quantity: 30,  # refills: 10   Last office visit provider:  2020    Requested Prescriptions   Pending Prescriptions Disp Refills     lisinopril (ZESTRIL) 5 MG tablet [Pharmacy Med Name: LISINOPRIL 5MG TABLET] 30 tablet 10     Si TAB BY MOUTH DAILY (DX: HYPERTENSION)       ACE Inhibitors (Including Combos) Protocol Failed - 2021  9:00 AM        Failed - Normal serum potassium on file in past 12 months     Recent Labs   Lab Test 20  0929   POTASSIUM 5.2*             Passed - Blood pressure under 140/90 in past 12 months     BP Readings from Last 3 Encounters:   21 133/79   20 130/80                 Passed - Recent (12 mo) or future (30 days) visit within the authorizing provider's specialty     Patient has had an office visit with the authorizing provider or a provider within the authorizing providers department within the previous 12 mos or has a future within next 30 days. See \"Patient Info\" tab in inbasket, or \"Choose Columns\" in Meds & Orders section of the refill encounter.              Passed - Medication is active on med list        Passed - Patient is age 18 or older        Passed - Normal serum creatinine on file in past 12 months     Recent Labs   Lab Test 20  0929   CR 1.28       Ok to refill medication if creatinine is low           Signed Prescriptions Disp Refills    simvastatin (ZOCOR) 20 MG tablet 90 tablet 0     Si TAB BY MOUTH EVERY EVENING (DX: HYPERLIPIDEMIA)       Statins Protocol Passed - 2021  9:00 AM        Passed - LDL on file in past 12 months     Recent Labs   Lab Test 20  0929   LDL 94             Passed - No abnormal creatine kinase in past 12 months     No lab results found.             Passed - Recent (12 mo) or future (30 days) visit within the authorizing provider's " "specialty     Patient has had an office visit with the authorizing provider or a provider within the authorizing providers department within the previous 12 mos or has a future within next 30 days. See \"Patient Info\" tab in inbasket, or \"Choose Columns\" in Meds & Orders section of the refill encounter.              Passed - Medication is active on med list        Passed - Patient is age 18 or older          metoprolol succinate ER (TOPROL-XL) 25 MG 24 hr tablet 90 tablet 0     Si TAB BY MOUTH DAILY (DX: HYPERTENSION)       Beta-Blockers Protocol Passed - 2021  9:00 AM        Passed - Blood pressure under 140/90 in past 12 months     BP Readings from Last 3 Encounters:   21 133/79   20 130/80                 Passed - Patient is age 6 or older        Passed - Recent (12 mo) or future (30 days) visit within the authorizing provider's specialty     Patient has had an office visit with the authorizing provider or a provider within the authorizing providers department within the previous 12 mos or has a future within next 30 days. See \"Patient Info\" tab in inbasket, or \"Choose Columns\" in Meds & Orders section of the refill encounter.              Passed - Medication is active on med list             Jesus Lewis RN 10/01/21 10:26 AM  "

## 2021-10-11 ENCOUNTER — HEALTH MAINTENANCE LETTER (OUTPATIENT)
Age: 66
End: 2021-10-11

## 2021-10-26 DIAGNOSIS — K59.01 SLOW TRANSIT CONSTIPATION: ICD-10-CM

## 2021-10-26 DIAGNOSIS — K21.9 GASTROESOPHAGEAL REFLUX DISEASE WITHOUT ESOPHAGITIS: ICD-10-CM

## 2021-10-27 NOTE — TELEPHONE ENCOUNTER
"Routing refill request to provider for review/approval because:  Patient needs to be seen because:  Almost 1 year since last visit and has been seeing [facility] Everywhere encounters in Chart Review    Last Written Prescription Date:  20 and 20  Last Fill Quantity: 30 and 110,  # refills: 11 and 10   Last office visit provider:  2020     Requested Prescriptions   Pending Prescriptions Disp Refills     SENEXON-S 8.6-50 MG tablet [Pharmacy Med Name: SENEXON-S 8.6MG-50MG TABLET] 60 tablet 10     Si TAB BY MOUTH TWICE DAILY (DX: CONSTIPATION)       Laxatives Protocol Passed - 10/26/2021  7:35 AM        Passed - Patient is age 6 or older        Passed - Recent (12 mo) or future (30 days) visit within the authorizing provider's specialty     Patient has had an office visit with the authorizing provider or a provider within the authorizing providers department within the previous 12 mos or has a future within next 30 days. See \"Patient Info\" tab in inbasket, or \"Choose Columns\" in Meds & Orders section of the refill encounter.              Passed - Medication is active on med list           famotidine (PEPCID) 20 MG tablet [Pharmacy Med Name: FAMOTIDINE 20MG TABLET] 30 tablet 10     Si TAB BY MOUTH EVERY BEDTIME       H2 Blockers Protocol Passed - 10/26/2021  7:35 AM        Passed - Patient is age 12 or older        Passed - Recent (12 mo) or future (30 days) visit within the authorizing provider's specialty     Patient has had an office visit with the authorizing provider or a provider within the authorizing providers department within the previous 12 mos or has a future within next 30 days. See \"Patient Info\" tab in inbasket, or \"Choose Columns\" in Meds & Orders section of the refill encounter.              Passed - Medication is active on med list             Migdalia Busby RN 10/27/21 3:31 PM  "

## 2021-10-28 RX ORDER — FAMOTIDINE 20 MG/1
TABLET, FILM COATED ORAL
Qty: 30 TABLET | Refills: 10 | Status: SHIPPED | OUTPATIENT
Start: 2021-10-28 | End: 2022-09-22

## 2021-10-28 RX ORDER — DOCUSATE SODIUM 50MG AND SENNOSIDES 8.6MG 8.6; 5 MG/1; MG/1
TABLET, FILM COATED ORAL
Qty: 60 TABLET | Refills: 10 | Status: SHIPPED | OUTPATIENT
Start: 2021-10-28 | End: 2022-08-17

## 2021-12-05 ENCOUNTER — HEALTH MAINTENANCE LETTER (OUTPATIENT)
Age: 66
End: 2021-12-05

## 2021-12-16 DIAGNOSIS — E03.9 HYPOTHYROIDISM, UNSPECIFIED TYPE: Primary | ICD-10-CM

## 2021-12-19 RX ORDER — LEVOTHYROXINE SODIUM 137 UG/1
TABLET ORAL
Qty: 30 TABLET | Refills: 10 | Status: SHIPPED | OUTPATIENT
Start: 2021-12-19

## 2021-12-19 NOTE — TELEPHONE ENCOUNTER
"Outpatient Medication Detail     Disp Refills Start End ODETTE   levothyroxine (SYNTHROID, LEVOTHROID) 200 MCG tablet 30 tablet 10 10/23/2020  No   Si TAB BY MOUTH DAILY ON AN EMPTY STOMACH (DX: HYPOTHYROIDISM)   Sent to pharmacy as: levothyroxine 200 mcg tablet (SYNTHROID, LEVOTHROID)   E-Prescribing Status: Receipt confirmed by pharmacy (10/23/2020  5:48 PM CDT)       levothyroxine (SYNTHROID, LEVOTHROID) 200 MCG tablet [[id number]]    Electronically signed by: Samuel Kovacs MD on 10/23/20 1747 Status: Active   Ordering user: Samuel Kovacs MD 10/23/20 1747 Authorized by: Samuel Kovacs MD   Frequency:  10/23/20 - Until Discontinued Released by: Samuel Kovacs MD 10/23/20 1747   Diagnoses  Hypothyroidism, unspecified type [E03.9]     Routing refill request to provider for review/approval because:  Labs not current:  TSH  Patient needs to be seen because it has been more than 1 year since last office visit.    Last office visit provider:  20     Requested Prescriptions   Pending Prescriptions Disp Refills     levothyroxine (SYNTHROID/LEVOTHROID) 137 MCG tablet [Pharmacy Med Name: LEVOTHYROXINE SODIUM 137MCG TABLET] 30 tablet 10     Sig: TAKE 1 TABLET (137 MCG) BY MOUTH BEFORE BREAKFAST.       Thyroid Protocol Failed - 2021 10:55 AM        Failed - Recent (12 mo) or future (30 days) visit within the authorizing provider's specialty     Patient has had an office visit with the authorizing provider or a provider within the authorizing providers department within the previous 12 mos or has a future within next 30 days. See \"Patient Info\" tab in inbasket, or \"Choose Columns\" in Meds & Orders section of the refill encounter.              Failed - Normal TSH on file in past 12 months     Recent Labs   Lab Test 20  0929   TSH 0.11*              Passed - Patient is 12 years or older        Passed - Medication is active on med list             Mack Joseph RN 21 10:39 AM  "

## 2021-12-22 DIAGNOSIS — E78.00 HYPERCHOLESTEROLEMIA: ICD-10-CM

## 2021-12-22 DIAGNOSIS — I10 ESSENTIAL HYPERTENSION: ICD-10-CM

## 2021-12-25 RX ORDER — SIMVASTATIN 20 MG
TABLET ORAL
Qty: 30 TABLET | Refills: 10 | Status: SHIPPED | OUTPATIENT
Start: 2021-12-25

## 2021-12-25 RX ORDER — METOPROLOL SUCCINATE 25 MG/1
TABLET, EXTENDED RELEASE ORAL
Qty: 30 TABLET | Refills: 10 | Status: SHIPPED | OUTPATIENT
Start: 2021-12-25

## 2021-12-25 NOTE — TELEPHONE ENCOUNTER
"Routing refill request to provider for review/approval because:  Labs not current:  LCL  Patient needs to be seen because it has been more than 1 year since last office visit.    Simvastatin Last Written Prescription Date:  10/1/2021  Last Fill Quantity: 90,  # refills: 0   Last office visit provider:  2020 Dr. Kovacs     Metoprolol succinate Last Written Prescription Date:  10/1/2021  Last Fill Quantity: 90,  # refills: 0   Last office visit provider:  2020 Dr. Kovacs     Requested Prescriptions   Pending Prescriptions Disp Refills     simvastatin (ZOCOR) 20 MG tablet [Pharmacy Med Name: SIMVASTATIN F/C 20MG TABLET] 30 tablet 10     Si TAB BY MOUTH EVERY EVENING (DX: HYPERLIPIDEMIA)       Statins Protocol Failed - 2021 12:15 PM        Failed - LDL on file in past 12 months     Recent Labs   Lab Test 20  0929   LDL 94             Failed - Recent (12 mo) or future (30 days) visit within the authorizing provider's specialty     Patient has had an office visit with the authorizing provider or a provider within the authorizing providers department within the previous 12 mos or has a future within next 30 days. See \"Patient Info\" tab in inbasket, or \"Choose Columns\" in Meds & Orders section of the refill encounter.              Passed - No abnormal creatine kinase in past 12 months     No lab results found.             Passed - Medication is active on med list        Passed - Patient is age 18 or older           metoprolol succinate ER (TOPROL-XL) 25 MG 24 hr tablet [Pharmacy Med Name: METOPROLOL SUCC ER 25MG TAB ER 24H] 30 tablet 10     Si TAB BY MOUTH DAILY (DX: HYPERTENSION)       Beta-Blockers Protocol Failed - 2021 12:15 PM        Failed - Recent (12 mo) or future (30 days) visit within the authorizing provider's specialty     Patient has had an office visit with the authorizing provider or a provider within the authorizing providers department within the previous 12 mos or has a " "future within next 30 days. See \"Patient Info\" tab in inbasket, or \"Choose Columns\" in Meds & Orders section of the refill encounter.              Passed - Blood pressure under 140/90 in past 12 months     BP Readings from Last 3 Encounters:   05/14/21 133/79   11/16/20 130/80                 Passed - Patient is age 6 or older        Passed - Medication is active on med list             Franchesca Bains RN 12/24/21 11:15 PM  "

## 2022-01-18 VITALS
SYSTOLIC BLOOD PRESSURE: 130 MMHG | HEART RATE: 87 BPM | BODY MASS INDEX: 34.56 KG/M2 | WEIGHT: 234.06 LBS | TEMPERATURE: 96.3 F | DIASTOLIC BLOOD PRESSURE: 80 MMHG

## 2022-01-18 VITALS
DIASTOLIC BLOOD PRESSURE: 79 MMHG | BODY MASS INDEX: 33.18 KG/M2 | SYSTOLIC BLOOD PRESSURE: 133 MMHG | HEIGHT: 69 IN | WEIGHT: 224 LBS | HEART RATE: 87 BPM

## 2022-01-18 VITALS
HEART RATE: 71 BPM | DIASTOLIC BLOOD PRESSURE: 68 MMHG | HEIGHT: 69 IN | BODY MASS INDEX: 32.88 KG/M2 | SYSTOLIC BLOOD PRESSURE: 113 MMHG | WEIGHT: 222 LBS

## 2022-01-18 ASSESSMENT — PATIENT HEALTH QUESTIONNAIRE - PHQ9
SUM OF ALL RESPONSES TO PHQ QUESTIONS 1-9: 27
SUM OF ALL RESPONSES TO PHQ QUESTIONS 1-9: 1
SUM OF ALL RESPONSES TO PHQ QUESTIONS 1-9: 0

## 2022-01-19 ASSESSMENT — ANXIETY QUESTIONNAIRES: GAD7 TOTAL SCORE: 1

## 2022-01-27 DIAGNOSIS — R52 PAIN: Primary | ICD-10-CM

## 2022-01-27 RX ORDER — PSEUDOEPHED/ACETAMINOPH/DIPHEN 30MG-500MG
TABLET ORAL
Qty: 30 TABLET | Refills: 10 | Status: SHIPPED | OUTPATIENT
Start: 2022-01-27

## 2022-02-15 RX ORDER — LIDOCAINE 50 MG/G
PATCH TOPICAL
COMMUNITY
Start: 2022-01-27

## 2022-02-17 ENCOUNTER — TELEPHONE (OUTPATIENT)
Dept: FAMILY MEDICINE | Facility: CLINIC | Age: 67
End: 2022-02-17
Payer: MEDICARE

## 2022-05-25 DIAGNOSIS — K59.01 SLOW TRANSIT CONSTIPATION: ICD-10-CM

## 2022-05-26 NOTE — TELEPHONE ENCOUNTER
"Routing refill request to provider for review/approval because:  Patient needs to be seen because it has been more than 1 year since last office visit. ? If still pt or pcp    Last Written Prescription Date:  5/26/21  Last Fill Quantity: 476,  # refills: 10   Last office visit provider:  5/14/19     Requested Prescriptions   Pending Prescriptions Disp Refills     polyethylene glycol (MIRALAX) 17 GM/Dose powder [Pharmacy Med Name: POLYETHYLENE GLYCOL 3350 30 ONCE-DAILY DOSES 17GM/1DOSE POWDER] 510 g 10     Sig: TAKE 17 G BY MOUTH DAILY.       Laxatives Protocol Failed - 5/25/2022 11:25 AM        Failed - Recent (12 mo) or future (30 days) visit within the authorizing provider's specialty     Patient has had an office visit with the authorizing provider or a provider within the authorizing providers department within the previous 12 mos or has a future within next 30 days. See \"Patient Info\" tab in inbasket, or \"Choose Columns\" in Meds & Orders section of the refill encounter.              Passed - Patient is age 6 or older        Passed - Medication is active on med list             Su Rivera RN 05/26/22 5:24 PM  "

## 2022-05-27 RX ORDER — POLYETHYLENE GLYCOL 3350 17 G/17G
POWDER, FOR SOLUTION ORAL
Qty: 510 G | Refills: 10 | Status: SHIPPED | OUTPATIENT
Start: 2022-05-27

## 2022-06-23 DIAGNOSIS — K59.01 SLOW TRANSIT CONSTIPATION: ICD-10-CM

## 2022-06-23 RX ORDER — DOCUSATE SODIUM 50MG AND SENNOSIDES 8.6MG 8.6; 5 MG/1; MG/1
TABLET, FILM COATED ORAL
Qty: 60 TABLET | Refills: 10 | OUTPATIENT
Start: 2022-06-23

## 2022-07-01 DIAGNOSIS — F20.3 UNDIFFERENTIATED SCHIZOPHRENIA (H): ICD-10-CM

## 2022-07-01 RX ORDER — LANOLIN ALCOHOL/MO/W.PET/CERES
CREAM (GRAM) TOPICAL
Qty: 30 TABLET | Refills: 0 | Status: SHIPPED | OUTPATIENT
Start: 2022-07-01 | End: 2022-08-17

## 2022-07-01 NOTE — TELEPHONE ENCOUNTER
"Routing refill request to provider for review/approval because:  Patient needs to be seen because it has been more than 1 year since last office visit.    Last Written Prescription Date:  21  Last Fill Quantity: 30,  # refills: 10   Last office visit provider:  20     Requested Prescriptions   Pending Prescriptions Disp Refills     folic acid (FOLVITE) 400 MCG tablet [Pharmacy Med Name: FOLIC ACID 0.4MG TABLET] 30 tablet 9     Si TAB BY MOUTH DAILY (DX: DEFICIENCY)       Vitamin Supplements (Adult) Protocol Failed - 2022  2:47 PM        Failed - Recent (12 mo) or future (30 days) visit within the authorizing provider's specialty     Patient has had an office visit with the authorizing provider or a provider within the authorizing providers department within the previous 12 mos or has a future within next 30 days. See \"Patient Info\" tab in inbasket, or \"Choose Columns\" in Meds & Orders section of the refill encounter.              Passed - High dose Vitamin D not ordered        Passed - Medication is active on med list             Mack Joseph RN 22 2:47 PM  "

## 2022-08-17 DIAGNOSIS — R33.9 RETENTION OF URINE: ICD-10-CM

## 2022-08-17 DIAGNOSIS — F20.3 UNDIFFERENTIATED SCHIZOPHRENIA (H): ICD-10-CM

## 2022-08-17 DIAGNOSIS — K59.01 SLOW TRANSIT CONSTIPATION: ICD-10-CM

## 2022-08-17 RX ORDER — TAMSULOSIN HYDROCHLORIDE 0.4 MG/1
CAPSULE ORAL
Qty: 30 CAPSULE | Refills: 0 | Status: SHIPPED | OUTPATIENT
Start: 2022-08-17

## 2022-08-17 RX ORDER — LANOLIN ALCOHOL/MO/W.PET/CERES
CREAM (GRAM) TOPICAL
Qty: 30 TABLET | Refills: 0 | Status: SHIPPED | OUTPATIENT
Start: 2022-08-17

## 2022-08-17 RX ORDER — AMOXICILLIN 250 MG
CAPSULE ORAL
Qty: 60 TABLET | Refills: 0 | Status: SHIPPED | OUTPATIENT
Start: 2022-08-17

## 2022-08-17 NOTE — TELEPHONE ENCOUNTER
Pending Prescriptions:                       Disp   Refills    folic acid (FOLVITE) 400 MCG tablet       30 tab*0            Si TAB BY MOUTH DAILY (DX: DEFICIENCY)    tamsulosin (FLOMAX) 0.4 MG capsule        30 cap*10           Si CAP BY MOUTH DAILY (DX: URINARY RETENTION)    senna-docusate (SENEXON-S) 8.6-50 MG tabl*60 tab*10           Si TAB BY MOUTH TWICE DAILY (DX: CONSTIPATION)

## 2022-09-20 DIAGNOSIS — K21.9 GASTROESOPHAGEAL REFLUX DISEASE WITHOUT ESOPHAGITIS: ICD-10-CM

## 2022-09-22 RX ORDER — FAMOTIDINE 20 MG/1
TABLET, FILM COATED ORAL
Qty: 30 TABLET | Refills: 10 | Status: SHIPPED | OUTPATIENT
Start: 2022-09-22

## 2022-09-24 ENCOUNTER — HEALTH MAINTENANCE LETTER (OUTPATIENT)
Age: 67
End: 2022-09-24

## 2023-02-07 DIAGNOSIS — K59.01 SLOW TRANSIT CONSTIPATION: ICD-10-CM

## 2023-02-08 RX ORDER — POLYETHYLENE GLYCOL 3350 17 G/17G
POWDER, FOR SOLUTION ORAL
Qty: 476 G | Refills: 9 | OUTPATIENT
Start: 2023-02-08

## 2023-08-25 DIAGNOSIS — K21.9 GASTROESOPHAGEAL REFLUX DISEASE WITHOUT ESOPHAGITIS: ICD-10-CM

## 2023-08-25 RX ORDER — FAMOTIDINE 20 MG/1
TABLET, FILM COATED ORAL
Qty: 30 TABLET | Refills: 10 | OUTPATIENT
Start: 2023-08-25

## 2024-10-23 RX ORDER — NIACIN 500 MG
TABLET ORAL
Qty: 100 TABLET | Refills: 0 | OUTPATIENT
Start: 2024-10-23